# Patient Record
Sex: FEMALE | Race: WHITE | NOT HISPANIC OR LATINO
[De-identification: names, ages, dates, MRNs, and addresses within clinical notes are randomized per-mention and may not be internally consistent; named-entity substitution may affect disease eponyms.]

---

## 2017-02-06 ENCOUNTER — APPOINTMENT (OUTPATIENT)
Dept: CARDIOLOGY | Facility: CLINIC | Age: 67
End: 2017-02-06

## 2017-03-14 ENCOUNTER — OUTPATIENT (OUTPATIENT)
Dept: OUTPATIENT SERVICES | Facility: HOSPITAL | Age: 67
LOS: 1 days | Discharge: HOME | End: 2017-03-14

## 2017-03-16 ENCOUNTER — APPOINTMENT (OUTPATIENT)
Dept: CARDIOLOGY | Facility: CLINIC | Age: 67
End: 2017-03-16

## 2017-03-16 VITALS — HEART RATE: 81 BPM | SYSTOLIC BLOOD PRESSURE: 120 MMHG | DIASTOLIC BLOOD PRESSURE: 70 MMHG

## 2017-03-16 VITALS — HEIGHT: 62 IN | WEIGHT: 189 LBS | BODY MASS INDEX: 34.78 KG/M2

## 2017-03-17 ENCOUNTER — MEDICATION RENEWAL (OUTPATIENT)
Age: 67
End: 2017-03-17

## 2017-05-27 ENCOUNTER — TRANSCRIPTION ENCOUNTER (OUTPATIENT)
Age: 67
End: 2017-05-27

## 2017-06-27 DIAGNOSIS — I10 ESSENTIAL (PRIMARY) HYPERTENSION: ICD-10-CM

## 2017-07-06 ENCOUNTER — APPOINTMENT (OUTPATIENT)
Dept: CARDIOLOGY | Facility: CLINIC | Age: 67
End: 2017-07-06

## 2017-08-17 ENCOUNTER — OUTPATIENT (OUTPATIENT)
Dept: OUTPATIENT SERVICES | Facility: HOSPITAL | Age: 67
LOS: 1 days | Discharge: HOME | End: 2017-08-17

## 2017-08-17 DIAGNOSIS — Z00.00 ENCOUNTER FOR GENERAL ADULT MEDICAL EXAMINATION WITHOUT ABNORMAL FINDINGS: ICD-10-CM

## 2017-08-17 DIAGNOSIS — R07.9 CHEST PAIN, UNSPECIFIED: ICD-10-CM

## 2017-08-23 ENCOUNTER — APPOINTMENT (OUTPATIENT)
Dept: CARDIOLOGY | Facility: CLINIC | Age: 67
End: 2017-08-23

## 2017-08-23 VITALS — HEIGHT: 62 IN | WEIGHT: 192 LBS | BODY MASS INDEX: 35.33 KG/M2

## 2017-08-23 VITALS — HEART RATE: 75 BPM | DIASTOLIC BLOOD PRESSURE: 80 MMHG | SYSTOLIC BLOOD PRESSURE: 140 MMHG

## 2017-12-15 ENCOUNTER — RX RENEWAL (OUTPATIENT)
Age: 67
End: 2017-12-15

## 2018-01-09 ENCOUNTER — OUTPATIENT (OUTPATIENT)
Dept: OUTPATIENT SERVICES | Facility: HOSPITAL | Age: 68
LOS: 1 days | Discharge: HOME | End: 2018-01-09

## 2018-01-09 DIAGNOSIS — R53.83 OTHER FATIGUE: ICD-10-CM

## 2018-01-09 DIAGNOSIS — I10 ESSENTIAL (PRIMARY) HYPERTENSION: ICD-10-CM

## 2018-01-09 DIAGNOSIS — E78.5 HYPERLIPIDEMIA, UNSPECIFIED: ICD-10-CM

## 2018-01-09 DIAGNOSIS — R07.9 CHEST PAIN, UNSPECIFIED: ICD-10-CM

## 2018-01-09 DIAGNOSIS — Z00.00 ENCOUNTER FOR GENERAL ADULT MEDICAL EXAMINATION WITHOUT ABNORMAL FINDINGS: ICD-10-CM

## 2018-02-27 ENCOUNTER — APPOINTMENT (OUTPATIENT)
Dept: CARDIOLOGY | Facility: CLINIC | Age: 68
End: 2018-02-27

## 2018-03-05 ENCOUNTER — MEDICATION RENEWAL (OUTPATIENT)
Age: 68
End: 2018-03-05

## 2018-03-20 ENCOUNTER — APPOINTMENT (OUTPATIENT)
Dept: CARDIOLOGY | Facility: CLINIC | Age: 68
End: 2018-03-20

## 2018-03-20 VITALS
WEIGHT: 188 LBS | DIASTOLIC BLOOD PRESSURE: 80 MMHG | HEART RATE: 80 BPM | BODY MASS INDEX: 34.6 KG/M2 | SYSTOLIC BLOOD PRESSURE: 140 MMHG | HEIGHT: 62 IN

## 2018-03-20 DIAGNOSIS — Z87.898 PERSONAL HISTORY OF OTHER SPECIFIED CONDITIONS: ICD-10-CM

## 2018-04-25 ENCOUNTER — OUTPATIENT (OUTPATIENT)
Dept: OUTPATIENT SERVICES | Facility: HOSPITAL | Age: 68
LOS: 1 days | Discharge: HOME | End: 2018-04-25

## 2018-04-25 DIAGNOSIS — M79.642 PAIN IN LEFT HAND: ICD-10-CM

## 2018-05-02 ENCOUNTER — OUTPATIENT (OUTPATIENT)
Dept: OUTPATIENT SERVICES | Facility: HOSPITAL | Age: 68
LOS: 1 days | Discharge: HOME | End: 2018-05-02

## 2018-05-02 DIAGNOSIS — R52 PAIN, UNSPECIFIED: ICD-10-CM

## 2018-07-19 ENCOUNTER — OUTPATIENT (OUTPATIENT)
Dept: OUTPATIENT SERVICES | Facility: HOSPITAL | Age: 68
LOS: 1 days | Discharge: HOME | End: 2018-07-19

## 2018-07-19 DIAGNOSIS — G56.00 CARPAL TUNNEL SYNDROME, UNSPECIFIED UPPER LIMB: ICD-10-CM

## 2018-07-19 DIAGNOSIS — S63.501A UNSPECIFIED SPRAIN OF RIGHT WRIST, INITIAL ENCOUNTER: ICD-10-CM

## 2018-07-31 ENCOUNTER — APPOINTMENT (OUTPATIENT)
Dept: CARDIOLOGY | Facility: CLINIC | Age: 68
End: 2018-07-31

## 2018-08-13 ENCOUNTER — LABORATORY RESULT (OUTPATIENT)
Age: 68
End: 2018-08-13

## 2018-08-13 ENCOUNTER — OUTPATIENT (OUTPATIENT)
Dept: OUTPATIENT SERVICES | Facility: HOSPITAL | Age: 68
LOS: 1 days | Discharge: HOME | End: 2018-08-13

## 2018-08-13 ENCOUNTER — APPOINTMENT (OUTPATIENT)
Dept: OBGYN | Facility: CLINIC | Age: 68
End: 2018-08-13
Payer: COMMERCIAL

## 2018-08-13 VITALS — HEIGHT: 61 IN | WEIGHT: 185 LBS | BODY MASS INDEX: 34.93 KG/M2

## 2018-08-13 PROCEDURE — 81003 URINALYSIS AUTO W/O SCOPE: CPT | Mod: QW

## 2018-08-13 PROCEDURE — 99397 PER PM REEVAL EST PAT 65+ YR: CPT

## 2018-08-14 DIAGNOSIS — Z01.419 ENCOUNTER FOR GYNECOLOGICAL EXAMINATION (GENERAL) (ROUTINE) WITHOUT ABNORMAL FINDINGS: ICD-10-CM

## 2018-08-19 LAB
BILIRUB UR QL STRIP: NORMAL
CLARITY UR: CLEAR
GLUCOSE UR-MCNC: NORMAL
HCG UR QL: NORMAL EU/DL
HGB UR QL STRIP.AUTO: NORMAL
KETONES UR-MCNC: NORMAL
LEUKOCYTE ESTERASE UR QL STRIP: 75
NITRITE UR QL STRIP: NORMAL
PH UR STRIP: 5
PROT UR STRIP-MCNC: NORMAL
SP GR UR STRIP: 1.02

## 2018-08-28 ENCOUNTER — APPOINTMENT (OUTPATIENT)
Dept: CARDIOLOGY | Facility: CLINIC | Age: 68
End: 2018-08-28

## 2018-08-28 VITALS
SYSTOLIC BLOOD PRESSURE: 144 MMHG | DIASTOLIC BLOOD PRESSURE: 80 MMHG | HEART RATE: 72 BPM | HEIGHT: 61 IN | BODY MASS INDEX: 34.36 KG/M2 | WEIGHT: 182 LBS

## 2018-09-05 LAB
ALBUMIN SERPL ELPH-MCNC: 4 G/DL
ALP BLD-CCNC: 87 U/L
ALT SERPL-CCNC: 15 U/L
ANION GAP SERPL CALC-SCNC: 11 MMOL/L
AST SERPL-CCNC: 16 U/L
BASOPHILS # BLD AUTO: 0.03 K/UL
BASOPHILS NFR BLD AUTO: 0.5 %
BILIRUB SERPL-MCNC: 0.4 MG/DL
BUN SERPL-MCNC: 16 MG/DL
CALCIUM SERPL-MCNC: 9.2 MG/DL
CHLORIDE SERPL-SCNC: 100 MMOL/L
CHOLEST SERPL-MCNC: 147 MG/DL
CHOLEST/HDLC SERPL: 4 RATIO
CO2 SERPL-SCNC: 28 MMOL/L
CREAT SERPL-MCNC: 0.7 MG/DL
EOSINOPHIL # BLD AUTO: 0.41 K/UL
EOSINOPHIL NFR BLD AUTO: 6.8 %
GLUCOSE SERPL-MCNC: 91 MG/DL
HCT VFR BLD CALC: 44.1 %
HDLC SERPL-MCNC: 37 MG/DL
HGB BLD-MCNC: 14.4 G/DL
IMM GRANULOCYTES NFR BLD AUTO: 0.2 %
LDLC SERPL CALC-MCNC: 97 MG/DL
LYMPHOCYTES # BLD AUTO: 1.68 K/UL
LYMPHOCYTES NFR BLD AUTO: 27.9 %
MAN DIFF?: NORMAL
MCHC RBC-ENTMCNC: 29.1 PG
MCHC RBC-ENTMCNC: 32.7 GM/DL
MCV RBC AUTO: 89.1 FL
MONOCYTES # BLD AUTO: 0.51 K/UL
MONOCYTES NFR BLD AUTO: 8.5 %
NEUTROPHILS # BLD AUTO: 3.39 K/UL
NEUTROPHILS NFR BLD AUTO: 56.1 %
PLATELET # BLD AUTO: 261 K/UL
POTASSIUM SERPL-SCNC: 4.8 MMOL/L
PROT SERPL-MCNC: 6.9 G/DL
RBC # BLD: 4.95 M/UL
RBC # FLD: 14.1 %
SODIUM SERPL-SCNC: 139 MMOL/L
TRIGL SERPL-MCNC: 89 MG/DL
WBC # FLD AUTO: 6.03 K/UL

## 2018-09-19 ENCOUNTER — FORM ENCOUNTER (OUTPATIENT)
Age: 68
End: 2018-09-19

## 2018-09-20 ENCOUNTER — APPOINTMENT (OUTPATIENT)
Dept: OBGYN | Facility: CLINIC | Age: 68
End: 2018-09-20
Payer: COMMERCIAL

## 2018-09-20 ENCOUNTER — OUTPATIENT (OUTPATIENT)
Dept: OUTPATIENT SERVICES | Facility: HOSPITAL | Age: 68
LOS: 1 days | Discharge: HOME | End: 2018-09-20

## 2018-09-20 DIAGNOSIS — Z12.31 ENCOUNTER FOR SCREENING MAMMOGRAM FOR MALIGNANT NEOPLASM OF BREAST: ICD-10-CM

## 2018-09-20 PROCEDURE — 77085 DXA BONE DENSITY AXL VRT FX: CPT

## 2018-10-13 ENCOUNTER — APPOINTMENT (OUTPATIENT)
Dept: OBGYN | Facility: CLINIC | Age: 68
End: 2018-10-13
Payer: COMMERCIAL

## 2018-10-13 PROCEDURE — 76830 TRANSVAGINAL US NON-OB: CPT

## 2018-10-13 PROCEDURE — 76856 US EXAM PELVIC COMPLETE: CPT

## 2019-01-16 ENCOUNTER — OUTPATIENT (OUTPATIENT)
Dept: OUTPATIENT SERVICES | Facility: HOSPITAL | Age: 69
LOS: 1 days | Discharge: HOME | End: 2019-01-16

## 2019-01-16 DIAGNOSIS — M54.5 LOW BACK PAIN: ICD-10-CM

## 2019-01-25 ENCOUNTER — LABORATORY RESULT (OUTPATIENT)
Age: 69
End: 2019-01-25

## 2019-01-25 ENCOUNTER — OUTPATIENT (OUTPATIENT)
Dept: OUTPATIENT SERVICES | Facility: HOSPITAL | Age: 69
LOS: 1 days | Discharge: HOME | End: 2019-01-25

## 2019-01-25 DIAGNOSIS — E78.5 HYPERLIPIDEMIA, UNSPECIFIED: ICD-10-CM

## 2019-01-25 DIAGNOSIS — R53.83 OTHER FATIGUE: ICD-10-CM

## 2019-01-25 DIAGNOSIS — F41.9 ANXIETY DISORDER, UNSPECIFIED: ICD-10-CM

## 2019-01-25 DIAGNOSIS — Z01.419 ENCOUNTER FOR GYNECOLOGICAL EXAMINATION (GENERAL) (ROUTINE) WITHOUT ABNORMAL FINDINGS: ICD-10-CM

## 2019-01-25 DIAGNOSIS — I10 ESSENTIAL (PRIMARY) HYPERTENSION: ICD-10-CM

## 2019-01-29 ENCOUNTER — APPOINTMENT (OUTPATIENT)
Dept: CARDIOLOGY | Facility: CLINIC | Age: 69
End: 2019-01-29

## 2019-01-29 VITALS
BODY MASS INDEX: 34.93 KG/M2 | HEART RATE: 84 BPM | DIASTOLIC BLOOD PRESSURE: 80 MMHG | SYSTOLIC BLOOD PRESSURE: 140 MMHG | WEIGHT: 185 LBS | HEIGHT: 61 IN

## 2019-01-29 VITALS — DIASTOLIC BLOOD PRESSURE: 80 MMHG | SYSTOLIC BLOOD PRESSURE: 120 MMHG

## 2019-01-29 NOTE — HISTORY OF PRESENT ILLNESS
[FreeTextEntry1] : The patient has had no chest pain . Has had arthritic pain . Not sleeping well. Still not being treated for KEN

## 2019-01-29 NOTE — PHYSICAL EXAM
[General Appearance - Well Developed] : well developed [Normal Appearance] : normal appearance [Well Groomed] : well groomed [General Appearance - Well Nourished] : well nourished [No Deformities] : no deformities [General Appearance - In No Acute Distress] : no acute distress [Normal Conjunctiva] : the conjunctiva exhibited no abnormalities [Eyelids - No Xanthelasma] : the eyelids demonstrated no xanthelasmas [Normal Oral Mucosa] : normal oral mucosa [No Oral Pallor] : no oral pallor [No Oral Cyanosis] : no oral cyanosis [Respiration, Rhythm And Depth] : normal respiratory rhythm and effort [Exaggerated Use Of Accessory Muscles For Inspiration] : no accessory muscle use [Auscultation Breath Sounds / Voice Sounds] : lungs were clear to auscultation bilaterally [Abdomen Soft] : soft [Abdomen Tenderness] : non-tender [Abdomen Mass (___ Cm)] : no abdominal mass palpated [Abnormal Walk] : normal gait [Gait - Sufficient For Exercise Testing] : the gait was sufficient for exercise testing [Nail Clubbing] : no clubbing of the fingernails [Cyanosis, Localized] : no localized cyanosis [Petechial Hemorrhages (___cm)] : no petechial hemorrhages [Skin Color & Pigmentation] : normal skin color and pigmentation [] : no rash [No Venous Stasis] : no venous stasis [Skin Lesions] : no skin lesions [No Skin Ulcers] : no skin ulcer [No Xanthoma] : no  xanthoma was observed [Oriented To Time, Place, And Person] : oriented to person, place, and time [Affect] : the affect was normal [Mood] : the mood was normal [No Anxiety] : not feeling anxious [Normal Rate] : normal [Rhythm Regular] : regular [No Murmur] : no murmurs heard [2+] : left 2+ [No Pitting Edema] : no pitting edema present [FreeTextEntry1] : No JVD  [Rt] : no varicose veins of the right leg [Lt] : no varicose veins of the left leg

## 2019-01-29 NOTE — ASSESSMENT
[FreeTextEntry1] : The patient is feeliing ok. Upset about her daughter being  . No chest pain . No significant CAD on recent CT of the coronary arteries.

## 2019-01-29 NOTE — REVIEW OF SYSTEMS
[Shortness Of Breath] : shortness of breath [Dyspnea on exertion] : dyspnea during exertion [Chest Pain] : chest pain [Joint Pain] : joint pain [Joint Swelling] : joint swelling [Joint Stiffness] : joint stiffness [Negative] : Heme/Lymph [Chest  Pressure] : no chest pressure [Lower Ext Edema] : no extremity edema [Palpitations] : no palpitations

## 2019-02-03 LAB
ALBUMIN SERPL ELPH-MCNC: 3.9 G/DL
ALP BLD-CCNC: 88 U/L
ALT SERPL-CCNC: 18 U/L
ANION GAP SERPL CALC-SCNC: 13 MMOL/L
AST SERPL-CCNC: 16 U/L
BILIRUB SERPL-MCNC: 0.3 MG/DL
BUN SERPL-MCNC: 16 MG/DL
CALCIUM SERPL-MCNC: 8.9 MG/DL
CHLORIDE SERPL-SCNC: 98 MMOL/L
CO2 SERPL-SCNC: 27 MMOL/L
CREAT SERPL-MCNC: 0.7 MG/DL
GLUCOSE SERPL-MCNC: 94 MG/DL
POTASSIUM SERPL-SCNC: 4.3 MMOL/L
PROT SERPL-MCNC: 6.5 G/DL
SODIUM SERPL-SCNC: 138 MMOL/L

## 2019-02-25 ENCOUNTER — APPOINTMENT (OUTPATIENT)
Dept: CARDIOLOGY | Facility: CLINIC | Age: 69
End: 2019-02-25

## 2019-03-08 ENCOUNTER — OUTPATIENT (OUTPATIENT)
Dept: OUTPATIENT SERVICES | Facility: HOSPITAL | Age: 69
LOS: 1 days | Discharge: HOME | End: 2019-03-08

## 2019-03-26 ENCOUNTER — MEDICATION RENEWAL (OUTPATIENT)
Age: 69
End: 2019-03-26

## 2019-05-12 ENCOUNTER — TRANSCRIPTION ENCOUNTER (OUTPATIENT)
Age: 69
End: 2019-05-12

## 2019-05-16 ENCOUNTER — OUTPATIENT (OUTPATIENT)
Dept: OUTPATIENT SERVICES | Facility: HOSPITAL | Age: 69
LOS: 1 days | Discharge: HOME | End: 2019-05-16

## 2019-05-16 DIAGNOSIS — M70.61 TROCHANTERIC BURSITIS, RIGHT HIP: ICD-10-CM

## 2019-06-25 ENCOUNTER — OUTPATIENT (OUTPATIENT)
Dept: OUTPATIENT SERVICES | Facility: HOSPITAL | Age: 69
LOS: 1 days | Discharge: HOME | End: 2019-06-25

## 2019-06-25 DIAGNOSIS — R53.83 OTHER FATIGUE: ICD-10-CM

## 2019-06-25 DIAGNOSIS — I10 ESSENTIAL (PRIMARY) HYPERTENSION: ICD-10-CM

## 2019-06-25 DIAGNOSIS — Z78.0 ASYMPTOMATIC MENOPAUSAL STATE: ICD-10-CM

## 2019-06-25 DIAGNOSIS — E78.5 HYPERLIPIDEMIA, UNSPECIFIED: ICD-10-CM

## 2019-06-30 LAB
ALBUMIN SERPL ELPH-MCNC: 4.1 G/DL
ALP BLD-CCNC: 95 U/L
ALT SERPL-CCNC: 17 U/L
ANION GAP SERPL CALC-SCNC: 14 MMOL/L
AST SERPL-CCNC: 16 U/L
BASOPHILS # BLD AUTO: 0.06 K/UL
BASOPHILS NFR BLD AUTO: 0.9 %
BILIRUB SERPL-MCNC: 0.5 MG/DL
BUN SERPL-MCNC: 17 MG/DL
CALCIUM SERPL-MCNC: 9 MG/DL
CHLORIDE SERPL-SCNC: 102 MMOL/L
CHOLEST SERPL-MCNC: 150 MG/DL
CHOLEST/HDLC SERPL: 3.3 RATIO
CO2 SERPL-SCNC: 25 MMOL/L
CREAT SERPL-MCNC: 0.7 MG/DL
EOSINOPHIL # BLD AUTO: 0.38 K/UL
EOSINOPHIL NFR BLD AUTO: 5.6 %
GLUCOSE SERPL-MCNC: 93 MG/DL
HCT VFR BLD CALC: 43.9 %
HDLC SERPL-MCNC: 45 MG/DL
HGB BLD-MCNC: 14.2 G/DL
IMM GRANULOCYTES NFR BLD AUTO: 0.3 %
LDLC SERPL CALC-MCNC: 96 MG/DL
LYMPHOCYTES # BLD AUTO: 1.67 K/UL
LYMPHOCYTES NFR BLD AUTO: 24.5 %
MAN DIFF?: NORMAL
MCHC RBC-ENTMCNC: 29 PG
MCHC RBC-ENTMCNC: 32.3 G/DL
MCV RBC AUTO: 89.6 FL
MONOCYTES # BLD AUTO: 0.71 K/UL
MONOCYTES NFR BLD AUTO: 10.4 %
NEUTROPHILS # BLD AUTO: 3.99 K/UL
NEUTROPHILS NFR BLD AUTO: 58.3 %
PLATELET # BLD AUTO: 286 K/UL
POTASSIUM SERPL-SCNC: 4.6 MMOL/L
PROT SERPL-MCNC: 6.8 G/DL
RBC # BLD: 4.9 M/UL
RBC # FLD: 13.9 %
SODIUM SERPL-SCNC: 141 MMOL/L
TRIGL SERPL-MCNC: 87 MG/DL
WBC # FLD AUTO: 6.83 K/UL

## 2019-07-01 ENCOUNTER — APPOINTMENT (OUTPATIENT)
Dept: CARDIOLOGY | Facility: CLINIC | Age: 69
End: 2019-07-01
Payer: COMMERCIAL

## 2019-07-01 VITALS
DIASTOLIC BLOOD PRESSURE: 70 MMHG | HEART RATE: 79 BPM | WEIGHT: 192 LBS | HEIGHT: 61 IN | BODY MASS INDEX: 36.25 KG/M2 | SYSTOLIC BLOOD PRESSURE: 150 MMHG

## 2019-07-01 PROCEDURE — 93000 ELECTROCARDIOGRAM COMPLETE: CPT

## 2019-07-01 PROCEDURE — 99214 OFFICE O/P EST MOD 30 MIN: CPT

## 2019-07-01 NOTE — ASSESSMENT
[FreeTextEntry1] : The patient has had multiple family issues , taking care of her niece and daughter is getting . . She has not had CP . BP is controlled and LDL is at goal. The patient had a CTA in 2016 which showed no significant CAD .

## 2019-07-01 NOTE — HISTORY OF PRESENT ILLNESS
[FreeTextEntry1] : The patient is under a lot of stress. Taking on a lot of fam,nazario issues . . No chest pain

## 2019-09-19 ENCOUNTER — RX RENEWAL (OUTPATIENT)
Age: 69
End: 2019-09-19

## 2019-10-21 ENCOUNTER — OUTPATIENT (OUTPATIENT)
Dept: OUTPATIENT SERVICES | Facility: HOSPITAL | Age: 69
LOS: 1 days | Discharge: HOME | End: 2019-10-21

## 2019-10-21 DIAGNOSIS — M54.89 OTHER DORSALGIA: ICD-10-CM

## 2019-12-03 ENCOUNTER — APPOINTMENT (OUTPATIENT)
Dept: CARDIOLOGY | Facility: CLINIC | Age: 69
End: 2019-12-03

## 2020-01-06 ENCOUNTER — FORM ENCOUNTER (OUTPATIENT)
Age: 70
End: 2020-01-06

## 2020-01-07 ENCOUNTER — OUTPATIENT (OUTPATIENT)
Dept: OUTPATIENT SERVICES | Facility: HOSPITAL | Age: 70
LOS: 1 days | Discharge: HOME | End: 2020-01-07
Payer: COMMERCIAL

## 2020-01-07 DIAGNOSIS — Z12.31 ENCOUNTER FOR SCREENING MAMMOGRAM FOR MALIGNANT NEOPLASM OF BREAST: ICD-10-CM

## 2020-01-07 PROCEDURE — 77063 BREAST TOMOSYNTHESIS BI: CPT | Mod: 26

## 2020-01-07 PROCEDURE — 77067 SCR MAMMO BI INCL CAD: CPT | Mod: 26

## 2020-01-30 ENCOUNTER — LABORATORY RESULT (OUTPATIENT)
Age: 70
End: 2020-01-30

## 2020-01-30 ENCOUNTER — APPOINTMENT (OUTPATIENT)
Dept: OBGYN | Facility: CLINIC | Age: 70
End: 2020-01-30
Payer: COMMERCIAL

## 2020-01-30 VITALS — BODY MASS INDEX: 35.87 KG/M2 | WEIGHT: 190 LBS | HEIGHT: 61 IN

## 2020-01-30 LAB
BILIRUB UR QL STRIP: NORMAL
CLARITY UR: CLEAR
GLUCOSE UR-MCNC: NORMAL
HCG UR QL: NORMAL EU/DL
HGB UR QL STRIP.AUTO: NORMAL
KETONES UR-MCNC: NORMAL
LEUKOCYTE ESTERASE UR QL STRIP: 25
NITRITE UR QL STRIP: NORMAL
PH UR STRIP: 5
PROT UR STRIP-MCNC: NORMAL
SP GR UR STRIP: 1.02

## 2020-01-30 PROCEDURE — 99397 PER PM REEVAL EST PAT 65+ YR: CPT

## 2020-01-30 PROCEDURE — 81003 URINALYSIS AUTO W/O SCOPE: CPT | Mod: QW

## 2020-01-31 ENCOUNTER — LABORATORY RESULT (OUTPATIENT)
Age: 70
End: 2020-01-31

## 2020-02-03 ENCOUNTER — APPOINTMENT (OUTPATIENT)
Dept: OBGYN | Facility: CLINIC | Age: 70
End: 2020-02-03

## 2020-02-05 ENCOUNTER — APPOINTMENT (OUTPATIENT)
Dept: CARDIOLOGY | Facility: CLINIC | Age: 70
End: 2020-02-05
Payer: COMMERCIAL

## 2020-02-05 VITALS
DIASTOLIC BLOOD PRESSURE: 86 MMHG | HEIGHT: 61 IN | SYSTOLIC BLOOD PRESSURE: 128 MMHG | BODY MASS INDEX: 35.87 KG/M2 | HEART RATE: 72 BPM | WEIGHT: 190 LBS

## 2020-02-05 VITALS — SYSTOLIC BLOOD PRESSURE: 130 MMHG | DIASTOLIC BLOOD PRESSURE: 80 MMHG

## 2020-02-05 PROCEDURE — 99214 OFFICE O/P EST MOD 30 MIN: CPT

## 2020-02-05 PROCEDURE — 93000 ELECTROCARDIOGRAM COMPLETE: CPT

## 2020-02-05 NOTE — PHYSICAL EXAM
[General Appearance - Well Developed] : well developed [Well Groomed] : well groomed [Normal Appearance] : normal appearance [General Appearance - Well Nourished] : well nourished [General Appearance - In No Acute Distress] : no acute distress [No Deformities] : no deformities [Normal Conjunctiva] : the conjunctiva exhibited no abnormalities [Eyelids - No Xanthelasma] : the eyelids demonstrated no xanthelasmas [Normal Oral Mucosa] : normal oral mucosa [No Oral Pallor] : no oral pallor [No Oral Cyanosis] : no oral cyanosis [Respiration, Rhythm And Depth] : normal respiratory rhythm and effort [Exaggerated Use Of Accessory Muscles For Inspiration] : no accessory muscle use [Auscultation Breath Sounds / Voice Sounds] : lungs were clear to auscultation bilaterally [Abdomen Soft] : soft [Abdomen Tenderness] : non-tender [Abdomen Mass (___ Cm)] : no abdominal mass palpated [Gait - Sufficient For Exercise Testing] : the gait was sufficient for exercise testing [Abnormal Walk] : normal gait [Nail Clubbing] : no clubbing of the fingernails [Cyanosis, Localized] : no localized cyanosis [Petechial Hemorrhages (___cm)] : no petechial hemorrhages [Skin Color & Pigmentation] : normal skin color and pigmentation [] : no rash [No Venous Stasis] : no venous stasis [No Xanthoma] : no  xanthoma was observed [Skin Lesions] : no skin lesions [No Skin Ulcers] : no skin ulcer [Oriented To Time, Place, And Person] : oriented to person, place, and time [Mood] : the mood was normal [Affect] : the affect was normal [Normal Rate] : normal [No Anxiety] : not feeling anxious [Rhythm Regular] : regular [No Murmur] : no murmurs heard [2+] : left 2+ [No Pitting Edema] : no pitting edema present [FreeTextEntry1] : No JVD  [Rt] : no varicose veins of the right leg [Lt] : no varicose veins of the left leg

## 2020-02-05 NOTE — REVIEW OF SYSTEMS
[Shortness Of Breath] : shortness of breath [Dyspnea on exertion] : dyspnea during exertion [Chest Pain] : chest pain [Joint Swelling] : joint swelling [Joint Pain] : joint pain [Joint Stiffness] : joint stiffness [Negative] : Heme/Lymph [Chest  Pressure] : no chest pressure [Lower Ext Edema] : no extremity edema [Palpitations] : no palpitations

## 2020-02-05 NOTE — ASSESSMENT
[FreeTextEntry1] : The patient had a CTA in 2016 which showed no significant CAD . Her BP is stable when allowed to relax inoffic e LDL is increased

## 2020-02-07 ENCOUNTER — APPOINTMENT (OUTPATIENT)
Dept: OBGYN | Facility: CLINIC | Age: 70
End: 2020-02-07
Payer: COMMERCIAL

## 2020-02-07 PROCEDURE — 76830 TRANSVAGINAL US NON-OB: CPT

## 2020-02-25 ENCOUNTER — TRANSCRIPTION ENCOUNTER (OUTPATIENT)
Age: 70
End: 2020-02-25

## 2020-03-04 ENCOUNTER — TRANSCRIPTION ENCOUNTER (OUTPATIENT)
Age: 70
End: 2020-03-04

## 2020-04-25 ENCOUNTER — MESSAGE (OUTPATIENT)
Age: 70
End: 2020-04-25

## 2020-05-07 LAB
SARS-COV-2 IGG SERPL IA-ACNC: <0.1 INDEX
SARS-COV-2 IGG SERPL QL IA: NEGATIVE

## 2020-09-18 ENCOUNTER — APPOINTMENT (OUTPATIENT)
Dept: CARDIOLOGY | Facility: CLINIC | Age: 70
End: 2020-09-18
Payer: COMMERCIAL

## 2020-09-18 VITALS
HEART RATE: 77 BPM | SYSTOLIC BLOOD PRESSURE: 124 MMHG | BODY MASS INDEX: 34.23 KG/M2 | WEIGHT: 186 LBS | HEIGHT: 62 IN | DIASTOLIC BLOOD PRESSURE: 80 MMHG

## 2020-09-18 PROCEDURE — 93000 ELECTROCARDIOGRAM COMPLETE: CPT

## 2020-09-18 PROCEDURE — 99214 OFFICE O/P EST MOD 30 MIN: CPT

## 2020-09-18 NOTE — ASSESSMENT
[FreeTextEntry1] : The patient had a CTA in 2016 which showed no significant CAD . Her BP is stable and LDL is at goal and she has no symptoms

## 2020-09-18 NOTE — HISTORY OF PRESENT ILLNESS
[FreeTextEntry1] : No chest pain or shortness of breath.  Overasll feeling well except for stress .

## 2020-11-16 ENCOUNTER — TRANSCRIPTION ENCOUNTER (OUTPATIENT)
Age: 70
End: 2020-11-16

## 2021-03-02 ENCOUNTER — APPOINTMENT (OUTPATIENT)
Dept: PLASTIC SURGERY | Facility: CLINIC | Age: 71
End: 2021-03-02
Payer: COMMERCIAL

## 2021-03-02 VITALS — BODY MASS INDEX: 33.13 KG/M2 | WEIGHT: 180 LBS | HEIGHT: 62 IN

## 2021-03-02 PROCEDURE — 99203 OFFICE O/P NEW LOW 30 MIN: CPT

## 2021-03-02 PROCEDURE — 99072 ADDL SUPL MATRL&STAF TM PHE: CPT

## 2021-03-02 RX ORDER — MENTHOL/CAMPHOR 0.5 %-0.5%
1000 LOTION (ML) TOPICAL
Refills: 0 | Status: ACTIVE | COMMUNITY

## 2021-03-02 RX ORDER — FISH OIL/E/FATTY ACID5/HERB137 400 MG-5
CAPSULE ORAL
Refills: 0 | Status: ACTIVE | COMMUNITY

## 2021-03-02 NOTE — ASSESSMENT
[FreeTextEntry1] : 69 y/o F with right index finger mucous cyst and left hand Keinbock's disease\par \par -BL hand XRs\par \par as above\par right index finger mucous cyst for past few months\par also left Kienbocks, with lunate scloerosis seen ion 2018 xray (saw carrie--told NTD)\par \par ptr requests excision of mcuous cyst\par \par needs B/L hand xrays\par \par Regarding the hand surgery, we discussed the risk of bleeding, infection, need for additional unplanned surgery, need for postoperative hand occupational therapy, possible lack of improvement and diminished hand function.  We discussed prolonged recovery.  All questions were answered and all risks were well understood by the patient.\par \par Regarding the procedure, we discussed scarring, poor wound healing, bleeding, infection, need for additional surgery, and dissatisfaction with the outcome.  Also discussed possibility of keloid and/or hypertrophic scar formation as well as recurrence.  All questions were answered and risks understood.\par \par Due to COVID 19, pre-visit patient instructions were explained to the patient and their symptoms were checked upon arrival.  \par Masks were used by the health care providers and staff and the examination room was cleaned after the patient visit was completed.\par

## 2021-03-02 NOTE — PHYSICAL EXAM
[de-identified] : well-appearing, NAD [de-identified] : bilateral hands with OA changes, normal digital cascade and FROM, SILT, negative Phalen's and Tinels, right index dorsal DIP with 0.5cm raised cystic lesion

## 2021-03-02 NOTE — HISTORY OF PRESENT ILLNESS
[FreeTextEntry1] : Pt is a 71 y/o F, RHD, with PMH of HTN, HLD, and insomnia who presents for evaluation of right index finger mass x2 months. Denies pain, bleeding, or drainage. No XRs done. Denies trauma.\par \par Also c/o left hand pain. Has been diagnosed with Keinbock's disease. C/o intermittent pain to dorsal hand that radiates up her arm and wakes her up at night. Denies paraesthesias but c/o weakness. Had EMG done several years ago.\par \par nonsmoker, nondiabetic\par Occ:  at [facility]

## 2021-03-09 ENCOUNTER — RESULT REVIEW (OUTPATIENT)
Age: 71
End: 2021-03-09

## 2021-03-09 ENCOUNTER — OUTPATIENT (OUTPATIENT)
Dept: OUTPATIENT SERVICES | Facility: HOSPITAL | Age: 71
LOS: 1 days | Discharge: HOME | End: 2021-03-09
Payer: COMMERCIAL

## 2021-03-09 DIAGNOSIS — M67.449 GANGLION, UNSPECIFIED HAND: ICD-10-CM

## 2021-03-09 PROCEDURE — 73120 X-RAY EXAM OF HAND: CPT | Mod: 26,50

## 2021-03-26 ENCOUNTER — APPOINTMENT (OUTPATIENT)
Dept: CARDIOLOGY | Facility: CLINIC | Age: 71
End: 2021-03-26
Payer: COMMERCIAL

## 2021-03-26 VITALS — BODY MASS INDEX: 34.04 KG/M2 | HEIGHT: 62 IN | WEIGHT: 185 LBS | TEMPERATURE: 97.8 F | HEART RATE: 71 BPM

## 2021-03-26 VITALS — DIASTOLIC BLOOD PRESSURE: 80 MMHG | SYSTOLIC BLOOD PRESSURE: 120 MMHG

## 2021-03-26 PROCEDURE — 99214 OFFICE O/P EST MOD 30 MIN: CPT

## 2021-03-26 PROCEDURE — 99072 ADDL SUPL MATRL&STAF TM PHE: CPT

## 2021-03-26 PROCEDURE — 93000 ELECTROCARDIOGRAM COMPLETE: CPT

## 2021-03-26 NOTE — REVIEW OF SYSTEMS
[Shortness Of Breath] : shortness of breath [Dyspnea on exertion] : dyspnea during exertion [Chest  Pressure] : no chest pressure [Chest Pain] : chest pain [Lower Ext Edema] : no extremity edema [Palpitations] : no palpitations [Joint Pain] : joint pain [Joint Swelling] : joint swelling [Joint Stiffness] : joint stiffness [Negative] : Heme/Lymph

## 2021-03-26 NOTE — PHYSICAL EXAM
[General Appearance - Well Developed] : well developed [Normal Appearance] : normal appearance [Well Groomed] : well groomed [General Appearance - Well Nourished] : well nourished [No Deformities] : no deformities [General Appearance - In No Acute Distress] : no acute distress [Normal Conjunctiva] : the conjunctiva exhibited no abnormalities [Eyelids - No Xanthelasma] : the eyelids demonstrated no xanthelasmas [Normal Oral Mucosa] : normal oral mucosa [No Oral Pallor] : no oral pallor [No Oral Cyanosis] : no oral cyanosis [FreeTextEntry1] : No JVD  [Respiration, Rhythm And Depth] : normal respiratory rhythm and effort [Exaggerated Use Of Accessory Muscles For Inspiration] : no accessory muscle use [Auscultation Breath Sounds / Voice Sounds] : lungs were clear to auscultation bilaterally [Abdomen Soft] : soft [Abdomen Tenderness] : non-tender [Abdomen Mass (___ Cm)] : no abdominal mass palpated [Abnormal Walk] : normal gait [Gait - Sufficient For Exercise Testing] : the gait was sufficient for exercise testing [Nail Clubbing] : no clubbing of the fingernails [Cyanosis, Localized] : no localized cyanosis [Petechial Hemorrhages (___cm)] : no petechial hemorrhages [Skin Color & Pigmentation] : normal skin color and pigmentation [] : no rash [No Venous Stasis] : no venous stasis [Skin Lesions] : no skin lesions [No Skin Ulcers] : no skin ulcer [No Xanthoma] : no  xanthoma was observed [Oriented To Time, Place, And Person] : oriented to person, place, and time [Affect] : the affect was normal [Mood] : the mood was normal [No Anxiety] : not feeling anxious [Normal Rate] : normal [Rhythm Regular] : regular [No Murmur] : no murmurs heard [2+] : left 2+ [No Pitting Edema] : no pitting edema present [Rt] : no varicose veins of the right leg [Lt] : no varicose veins of the left leg

## 2021-03-26 NOTE — ASSESSMENT
[FreeTextEntry1] : The patient had a CTA in 2016 which showed no significant CAD . Her BP is stable and LDL is at goal and she has no symptoms The patient has been feeling well. The patient is going for right 2nd finger mucous cyst excision . The patient has more than 4 METS exercise capacity . She is an intermediate cardiac risk undergoing a minor risk procedure .

## 2021-03-26 NOTE — HISTORY OF PRESENT ILLNESS
[FreeTextEntry1] : No chest pain or shortness of breath.  The patient has not had chest pain or SOB .

## 2021-03-28 LAB
ALBUMIN SERPL ELPH-MCNC: 3.9 G/DL
ALP BLD-CCNC: 84 U/L
ALT SERPL-CCNC: 17 U/L
ANION GAP SERPL CALC-SCNC: 9 MMOL/L
AST SERPL-CCNC: 15 U/L
BASOPHILS # BLD AUTO: 0.06 K/UL
BASOPHILS NFR BLD AUTO: 1 %
BILIRUB SERPL-MCNC: 0.4 MG/DL
BUN SERPL-MCNC: 19 MG/DL
CALCIUM SERPL-MCNC: 8.9 MG/DL
CHLORIDE SERPL-SCNC: 104 MMOL/L
CHOLEST SERPL-MCNC: 143 MG/DL
CO2 SERPL-SCNC: 25 MMOL/L
CREAT SERPL-MCNC: 0.7 MG/DL
EOSINOPHIL # BLD AUTO: 0.5 K/UL
EOSINOPHIL NFR BLD AUTO: 8.1 %
GLUCOSE SERPL-MCNC: 96 MG/DL
HCT VFR BLD CALC: 42.9 %
HDLC SERPL-MCNC: 44 MG/DL
HGB BLD-MCNC: 13.8 G/DL
IMM GRANULOCYTES NFR BLD AUTO: 0.3 %
LDLC SERPL CALC-MCNC: 92 MG/DL
LYMPHOCYTES # BLD AUTO: 1.89 K/UL
LYMPHOCYTES NFR BLD AUTO: 30.7 %
MAN DIFF?: NORMAL
MCHC RBC-ENTMCNC: 29.2 PG
MCHC RBC-ENTMCNC: 32.2 G/DL
MCV RBC AUTO: 90.9 FL
MONOCYTES # BLD AUTO: 0.57 K/UL
MONOCYTES NFR BLD AUTO: 9.3 %
NEUTROPHILS # BLD AUTO: 3.11 K/UL
NEUTROPHILS NFR BLD AUTO: 50.6 %
NONHDLC SERPL-MCNC: 99 MG/DL
PLATELET # BLD AUTO: 290 K/UL
POTASSIUM SERPL-SCNC: 4.1 MMOL/L
PROT SERPL-MCNC: 6.5 G/DL
RBC # BLD: 4.72 M/UL
RBC # FLD: 13.6 %
SODIUM SERPL-SCNC: 138 MMOL/L
TRIGL SERPL-MCNC: 72 MG/DL
WBC # FLD AUTO: 6.15 K/UL

## 2021-03-29 ENCOUNTER — OUTPATIENT (OUTPATIENT)
Dept: OUTPATIENT SERVICES | Facility: HOSPITAL | Age: 71
LOS: 1 days | Discharge: HOME | End: 2021-03-29
Payer: COMMERCIAL

## 2021-03-29 VITALS
TEMPERATURE: 96 F | WEIGHT: 184.97 LBS | OXYGEN SATURATION: 97 % | HEIGHT: 62 IN | HEART RATE: 84 BPM | RESPIRATION RATE: 13 BRPM | DIASTOLIC BLOOD PRESSURE: 70 MMHG | SYSTOLIC BLOOD PRESSURE: 136 MMHG

## 2021-03-29 DIAGNOSIS — Z01.818 ENCOUNTER FOR OTHER PREPROCEDURAL EXAMINATION: ICD-10-CM

## 2021-03-29 DIAGNOSIS — Z96.659 PRESENCE OF UNSPECIFIED ARTIFICIAL KNEE JOINT: Chronic | ICD-10-CM

## 2021-03-29 DIAGNOSIS — M67.441 GANGLION, RIGHT HAND: ICD-10-CM

## 2021-03-29 DIAGNOSIS — Z96.651 PRESENCE OF RIGHT ARTIFICIAL KNEE JOINT: Chronic | ICD-10-CM

## 2021-03-29 NOTE — H&P PST ADULT - NSICDXPASTSURGICALHX_GEN_ALL_CORE_FT
PAST SURGICAL HISTORY:  H/O total knee replacement 2015, left    H/O total knee replacement, right 2009

## 2021-03-29 NOTE — H&P PST ADULT - HISTORY OF PRESENT ILLNESS
70y Female presents today for presurgical testing for excision of right index finger mucous cyst. Patient states that approximately 3 months ago she noted a firm growth on her right index finger DIP which is only painful if it is accidentally bumped. She denies any trauma or injury to the finger and denies any drainage.  Patient denies any CP, palpitations, SOB, cough, or dysuria. No recent URI or UTI.  Stated exercise tolerance is FOS 2          KEN screen reviewed    Patient denies any recent personal exposure to COVID19. Denies any sick contacts. Patient denies any travel within the past 30 days. Patient was instructed to quarantine until after procedure  PATIENT REPORTS COMPLETING 2 DOSE VACCINE FOR COVID 19 [place] ON 1/25/21.    Encounter for other preprocedural examination  Ganglion of right hand  ^M67.441 / [id number]                                                               04/12/21 [facility]    Anesthesia Alert  NO--Difficult Airway  NO--History of neck surgery or radiation  NO--Limited ROM of neck  NO--History of Malignant hyperthermia  NO--No personal or family history of Pseudocholinesterase deficiency.  YES--Prior Anesthesia Complication - PONV  NO--Latex Allergy  NO--Loose teeth  NO--History of Rheumatoid Arthritis  NO--KEN  NO--Other_____    Patient states that this is their complete medical history and list of medications

## 2021-03-29 NOTE — H&P PST ADULT - NSICDXPASTMEDICALHX_GEN_ALL_CORE_FT
PAST MEDICAL HISTORY:  Digital mucous cyst of finger right index finger    High cholesterol     HTN (hypertension)     Sleeping difficulty

## 2021-03-30 ENCOUNTER — RESULT REVIEW (OUTPATIENT)
Age: 71
End: 2021-03-30

## 2021-03-30 PROCEDURE — 71046 X-RAY EXAM CHEST 2 VIEWS: CPT | Mod: 26

## 2021-04-09 ENCOUNTER — OUTPATIENT (OUTPATIENT)
Dept: OUTPATIENT SERVICES | Facility: HOSPITAL | Age: 71
LOS: 1 days | Discharge: HOME | End: 2021-04-09

## 2021-04-09 ENCOUNTER — LABORATORY RESULT (OUTPATIENT)
Age: 71
End: 2021-04-09

## 2021-04-09 DIAGNOSIS — Z96.651 PRESENCE OF RIGHT ARTIFICIAL KNEE JOINT: Chronic | ICD-10-CM

## 2021-04-09 DIAGNOSIS — Z96.659 PRESENCE OF UNSPECIFIED ARTIFICIAL KNEE JOINT: Chronic | ICD-10-CM

## 2021-04-09 DIAGNOSIS — Z11.59 ENCOUNTER FOR SCREENING FOR OTHER VIRAL DISEASES: ICD-10-CM

## 2021-04-09 PROBLEM — E78.00 PURE HYPERCHOLESTEROLEMIA, UNSPECIFIED: Chronic | Status: ACTIVE | Noted: 2021-03-29

## 2021-04-09 PROBLEM — I10 ESSENTIAL (PRIMARY) HYPERTENSION: Chronic | Status: ACTIVE | Noted: 2021-03-29

## 2021-04-09 PROBLEM — M67.449 GANGLION, UNSPECIFIED HAND: Chronic | Status: ACTIVE | Noted: 2021-03-29

## 2021-04-09 PROBLEM — G47.9 SLEEP DISORDER, UNSPECIFIED: Chronic | Status: ACTIVE | Noted: 2021-03-29

## 2021-04-09 NOTE — ASU PATIENT PROFILE, ADULT - PMH
Digital mucous cyst of finger  right index finger  High cholesterol    HTN (hypertension)    Sleeping difficulty

## 2021-04-12 ENCOUNTER — APPOINTMENT (OUTPATIENT)
Dept: PLASTIC SURGERY | Facility: AMBULATORY SURGERY CENTER | Age: 71
End: 2021-04-12
Payer: COMMERCIAL

## 2021-04-12 ENCOUNTER — RESULT REVIEW (OUTPATIENT)
Age: 71
End: 2021-04-12

## 2021-04-12 ENCOUNTER — OUTPATIENT (OUTPATIENT)
Dept: OUTPATIENT SERVICES | Facility: HOSPITAL | Age: 71
LOS: 1 days | Discharge: HOME | End: 2021-04-12
Payer: COMMERCIAL

## 2021-04-12 VITALS
HEART RATE: 70 BPM | OXYGEN SATURATION: 98 % | SYSTOLIC BLOOD PRESSURE: 140 MMHG | DIASTOLIC BLOOD PRESSURE: 82 MMHG | RESPIRATION RATE: 22 BRPM

## 2021-04-12 VITALS
SYSTOLIC BLOOD PRESSURE: 164 MMHG | WEIGHT: 184.97 LBS | HEIGHT: 62 IN | DIASTOLIC BLOOD PRESSURE: 79 MMHG | HEART RATE: 83 BPM | TEMPERATURE: 98 F | OXYGEN SATURATION: 97 % | RESPIRATION RATE: 18 BRPM

## 2021-04-12 DIAGNOSIS — Z96.659 PRESENCE OF UNSPECIFIED ARTIFICIAL KNEE JOINT: Chronic | ICD-10-CM

## 2021-04-12 DIAGNOSIS — Z96.651 PRESENCE OF RIGHT ARTIFICIAL KNEE JOINT: Chronic | ICD-10-CM

## 2021-04-12 PROCEDURE — 26160 REMOVE TENDON SHEATH LESION: CPT | Mod: RT

## 2021-04-12 PROCEDURE — 88304 TISSUE EXAM BY PATHOLOGIST: CPT | Mod: 26

## 2021-04-12 RX ORDER — SODIUM CHLORIDE 9 MG/ML
1000 INJECTION, SOLUTION INTRAVENOUS
Refills: 0 | Status: DISCONTINUED | OUTPATIENT
Start: 2021-04-12 | End: 2021-04-26

## 2021-04-12 RX ORDER — HYDROMORPHONE HYDROCHLORIDE 2 MG/ML
0.5 INJECTION INTRAMUSCULAR; INTRAVENOUS; SUBCUTANEOUS
Refills: 0 | Status: DISCONTINUED | OUTPATIENT
Start: 2021-04-12 | End: 2021-04-12

## 2021-04-12 RX ORDER — ONDANSETRON 8 MG/1
4 TABLET, FILM COATED ORAL ONCE
Refills: 0 | Status: DISCONTINUED | OUTPATIENT
Start: 2021-04-12 | End: 2021-04-26

## 2021-04-12 RX ORDER — ACETAMINOPHEN 500 MG
650 TABLET ORAL ONCE
Refills: 0 | Status: DISCONTINUED | OUTPATIENT
Start: 2021-04-12 | End: 2021-04-26

## 2021-04-12 RX ORDER — MORPHINE SULFATE 50 MG/1
2 CAPSULE, EXTENDED RELEASE ORAL
Refills: 0 | Status: DISCONTINUED | OUTPATIENT
Start: 2021-04-12 | End: 2021-04-12

## 2021-04-12 RX ADMIN — SODIUM CHLORIDE 100 MILLILITER(S): 9 INJECTION, SOLUTION INTRAVENOUS at 10:02

## 2021-04-12 NOTE — ASU DISCHARGE PLAN (ADULT/PEDIATRIC) - CARE PROVIDER_API CALL
Neptali Golden)  Plastic Surgery; Surgery of the Hand  [address] [place] [address], [address]  [place] [address]  Phone: [phone]  Fax: [phone]  Follow Up Time:

## 2021-04-12 NOTE — CHART NOTE - NSCHARTNOTEFT_GEN_A_CORE
"Subjective:       Patient ID: Cindy Lyman is a 80 y.o. female.    Chief Complaint: Follow-up    Diagnosis: Anemia of chronic disease  HPI      She has  past medical history of CAD, asthma, CHF, COPD, depression, hypertension, thyroid disease, seen today for f/u for anemia of chronic disease. Bone marrow biopsy neg for hematological malignancy    Pt hospitalized July 2017 for abd pain.  she was found to be septic with fever and tachycardia (no leukocytosis). She was treated for a UTI and followed up with her PCP. She had persistent RUQ abdominal pain .  Followed by Surgery and underwentnd was referred to general surgery but has not yet seen them. H   US abdomen showed biliary sludge and cholelithiasis but no definite sonographic evidence to suggest acute cholecystitis. MRCP showed, "Markedly distended gallbladder with innumerable dependent gallstones.  There has been development of pericholecystic fluid near the fundus of the gallbladder that could reflect the sequela of cholecystitis" as well as a mild to moderate pericardial effusion. Plavix held in anticipation of cholecystectomy. NST 7/10/2017 stable. Low-intermediate cardiovascular risk for surgery. Laproscopic cholecystectomy 7/13/17. Intraoperative angiogram showed a retained common bile duct stone. Post cholecystectomy, patient failed extubation, was re-intubated then extubated   . Liver enzymes and TBil, have  improved, suggesting patient may have passed the stone seen in CBD. GI had initially considered ERCP, but will treat conservatively as LFT's normalizing  SHe reports she underwent 1 urpbc transfusion during hospitalization  Today, she is doing well  She continues with chronic arthalgias and  back pain-stable  She ambulates with assistance of walker  Mild GALVAN-stable, chronic  No melena,hematochezia or change in bowel habits    She is followed by GI and  Has undergone GI eval recently as outpat  Records currently not available      She is " PACU ANESTHESIA ADMISSION NOTE      Procedure: Excision, mucous cyst, finger      Post op diagnosis:      ____  Intubated  TV:______       Rate: ______      FiO2: ______    _x___  Patent Airway    _x___  Full return of protective reflexes    _x___  Full recovery from anesthesia / back to baseline status    Vitals  SPO2:-99% on 2l nc  HR:-68  RR:-14  B.P:-118/58  TEMP:-97.8    Mental Status:  _x___ Awake   ___x_ Alert   _____ Drowsy   _____ Sedated    Nausea/Vomiting:  _x___  NO       ______Yes,   See Post - Op Orders         Pain Scale (0-10):  __0___    Treatment: _x___ None    __x__ See Post - Op/PCA Orders    Post - Operative Fluids:   ___ Oral   ____x See Post - Op Orders    Plan: Discharge:   ___x_Home       _____Floor     _____Critical Care    _____  Other:_________________    Comments:  Report endorsed to RN in pacu  Vitals stable  No anesthesia issues or complications noted.  Discharge to patient to  home when criteria met. "followed by Cardiology for CHF.      Hx of RLE DVT  S/p anticoagulation completed 11/2015     Colonoscopy 5 yrs ago  [facility] - unremarkable    She is followed by Pulm for COPD    Bone Marrow biopsy 5/26/2016  Hypercellular marrow for age, 50-70%, with trilineage hematopoiesis, see comment  --Erythroid hyperplasia  --Mild plasmacytosis, 5-10%, polytypic by in situ hybridization studies, see comment  --No increase in blasts  --Adequate megakaryocytes  --Decreased stainable iron  --Mild reticulin fibrosis  COMMENT: Concomitantly submitted flow cytometric analysis detects no abnormal hematopoietic population. Bcells are polyclonal with a subset of hematogones detected, and T cells are immunophenotypically unremarkable.  The blast gate is not increased.Although there is a mild plasmacytosis, by in situ hybridization studies, this appears polytypic. Correlate clinicallyand with any available cytogenetic and molecular studies    Plasma cell proliferative disorder, FISH:  An insufficient number of plasma cells were observed using cytoplasmic immunoglobulin staining method .This result does not exclude the presence of a plasma cell proliferative disorder."    Congo red stain neg    PAST MEDICAL HISTORY:  Aortic stenosis, arthritis, asthma, CAD, carpal tunnel, cataracts, CHF, COPD, depression, hyperlipidemia, hypertension, pulmonary hypertension, thyroid disease, TMJ.    PAST SURGICAL HISTORY:  Partial hysterectomy, carpal tunnel release, eye surgery, left hip replacement, back surgery, TMJ.            Review of Systems   Constitutional: Negative for appetite change, fatigue and unexpected weight change.   HENT: Negative for congestion, hearing loss and nosebleeds.    Eyes: Negative for visual disturbance.   Respiratory: Positive for shortness of breath. Negative for cough and chest tightness.    Cardiovascular: Negative for chest pain and leg swelling.   Gastrointestinal: Negative for abdominal pain, blood in stool, " "constipation, diarrhea, nausea and vomiting.   Genitourinary: Negative for flank pain and hematuria.   Musculoskeletal: Positive for arthralgias and back pain. Negative for gait problem, joint swelling and neck pain.   Skin: Positive for rash.        No petechiae, ecchymoses   Neurological: Negative for dizziness, light-headedness, numbness and headaches.   Hematological: Negative for adenopathy. Does not bruise/bleed easily.         Lab Results   Component Value Date    WBC 4.45 09/11/2017    HGB 9.5 (L) 09/11/2017    HCT 30.2 (L) 09/11/2017    MCV 92 09/11/2017     09/11/2017         Objective:       Vitals:    09/15/17 1308   BP: (!) 146/70   BP Location: Left arm   Patient Position: Sitting   BP Method: Medium (Automatic)   Pulse: 62   Temp: 98.8 °F (37.1 °C)   TempSrc: Oral   SpO2: 98%   Weight: 92.4 kg (203 lb 13 oz)   Height: 5' 5" (1.651 m)       Physical Exam   Constitutional: She is oriented to person, place, and time. She appears well-developed and well-nourished.   HENT:   Head: Normocephalic.   Mouth/Throat: Oropharynx is clear and moist. No oropharyngeal exudate.   Eyes: Conjunctivae and lids are normal. Pupils are equal, round, and reactive to light. No scleral icterus.   Neck: Normal range of motion. Neck supple. No thyromegaly present.   Cardiovascular: Normal rate and regular rhythm.    Murmur heard.  Pulmonary/Chest: Breath sounds normal. She has no wheezes. She has no rales.   Abdominal: Soft. Bowel sounds are normal. She exhibits no distension and no mass. There is no hepatosplenomegaly. There is no tenderness. There is no rebound and no guarding.   Musculoskeletal: Normal range of motion. She exhibits edema ( 1+ RLE edema). She exhibits no tenderness.   Lymphadenopathy:     She has no cervical adenopathy.     She has no axillary adenopathy.        Right: No supraclavicular adenopathy present.        Left: No supraclavicular adenopathy present.   Neurological: She is alert and oriented to " "person, place, and time. No cranial nerve deficit. Coordination normal.   Skin: Skin is warm and dry. No ecchymosis, no petechiae and no rash noted. No erythema.   Psychiatric: She has a normal mood and affect.         Results for MARIE TURNER (MRN [id number]) as of 6/15/2017 13:23   Ref. Range 6/10/2016 11:08 1/9/2017 13:46 6/12/2017 10:52   Elmer Free Light Chains Latest Ref Range: 0.33 - 1.94 mg/dL 4.01 (H) 6.29 (H) 4.62 (H)   Lambda Free Light Chains Latest Ref Range: 0.57 - 2.63 mg/dL 1.68 2.93 (H) 1.63   Kappa/Lambda FLC Ratio Latest Ref Range: 0.26 - 1.65  2.39 (H) 2.15 (H) 2.83 (H)                 Lab Results   Component Value Date    WBC 4.45 09/11/2017    HGB 9.5 (L) 09/11/2017    HCT 30.2 (L) 09/11/2017    MCV 92 09/11/2017     09/11/2017       Lab Results   Component Value Date    IRON 65 09/11/2017    TIBC 318 09/11/2017    FERRITIN 93 09/11/2017     Bone Marrow biopsy 5/26/2016  Hypercellular marrow for age, 50-70%, with trilineage hematopoiesis, see comment  --Erythroid hyperplasia  --Mild plasmacytosis, 5-10%, polytypic by in situ hybridization studies, see comment  --No increase in blasts  --Adequate megakaryocytes  --Decreased stainable iron  --Mild reticulin fibrosis  COMMENT: Concomitantly submitted flow cytometric analysis detects no abnormal hematopoietic population. Bcells are polyclonal with a subset of hematogones detected, and T cells are immunophenotypically unremarkable.  The blast gate is not increased.Although there is a mild plasmacytosis, by in situ hybridization studies, this appears polytypic. Correlate clinicallyand with any available cytogenetic and molecular studies    Plasma cell proliferative disorder, FISH:  An insufficient number of plasma cells were observed using cytoplasmic immunoglobulin staining method .This result does not exclude the presence of a plasma cell proliferative disorder."  Assessment:       1. Anemia of chronic disease    2. Chronic " obstructive pulmonary disease, unspecified COPD type        Plan:       Pt clinically stable  Hb 9.5 g/dl   Follow-up on recent GI eval  Colonoscopy 5 yrs ago [facility] [place]- unremarkable  S/p extensive hematological workup including bmbx- neg for hematological malignancy  Cont to monitor  No active  bleeding   Follow-up with GI next week    F/u  2mo with cbc, Fe studies, retic ct FLC  prior to f/u      CC: Jeremiah Reeves M.D.

## 2021-04-12 NOTE — ASU DISCHARGE PLAN (ADULT/PEDIATRIC) - ASU DC SPECIAL INSTRUCTIONSFT
Please follow up with Dr. Golden within 1 week after discharge from the hospital. You may call [phone] to schedule an appointment.     Please keep your dressing on, clean and dry. This will be removed in the office at follow up.     Please take Tylenol and Motrin over the counter as directed for pain.

## 2021-04-13 ENCOUNTER — NON-APPOINTMENT (OUTPATIENT)
Age: 71
End: 2021-04-13

## 2021-04-14 LAB — SURGICAL PATHOLOGY STUDY: SIGNIFICANT CHANGE UP

## 2021-04-19 ENCOUNTER — APPOINTMENT (OUTPATIENT)
Dept: PLASTIC SURGERY | Facility: CLINIC | Age: 71
End: 2021-04-19
Payer: COMMERCIAL

## 2021-04-19 PROCEDURE — 99024 POSTOP FOLLOW-UP VISIT: CPT

## 2021-04-19 NOTE — HISTORY OF PRESENT ILLNESS
[FreeTextEntry1] : Pt is a 69 y/o F, RHD, with PMH of HTN, HLD, and insomnia who presents for evaluation of right index finger mass x2 months. Denies pain, bleeding, or drainage. No XRs done. Denies trauma.\par \par Also c/o left hand pain. Has been diagnosed with Keinbock's disease. C/o intermittent pain to dorsal hand that radiates up her arm and wakes her up at night. Denies paraesthesias but c/o weakness. Had EMG done several years ago.\par \par nonsmoker, nondiabetic\par Occ:  at [facility]\par \par Interval hx (4/19/21). Patient presents today POD#7 s/p excision of right index mucous cyst. Doing well c/o slight discomfort and swelling. Denies any f/c or bleeding.

## 2021-04-19 NOTE — ASSESSMENT
[FreeTextEntry1] : 71 y/o F with right index finger mucous cyst and left hand Keinbock's disease now POD#7 s/p excision of mucous cyst. Doing well. \par \par - dressing changed\par - pathology discussed\par - post-op instructions reviewed and all questions answered\par - f/u next week for suture removal \par \par \par Due to COVID 19, pre-visit patient instructions were explained to the patient and their symptoms were checked upon arrival.  \par Masks were used by the health care providers and staff and the examination room was cleaned after the patient visit was completed.\par

## 2021-04-19 NOTE — DATA REVIEWED
[FreeTextEntry1] : \par  Pathology             Final\par \par No Documents Attached\par \par \par \par \par   [facility] Accession Number : [id number]\par \par GIULIA POWER                   1\par \par \par \par Surgical Final Report\par \par \par \par \par Final Diagnosis\par Labelled as right index finger mucous cyst:\par - Fragments of osteocartilage and fibroconnective tissue\par suggestive of mucous\par cyst.\par \par Verified by: Danika Saenz MD\par (Electronic Signature)\par Reported on: 04/14/21 12:32 EDT, [address] [place] [address]\par Phone: [phone]   Fax: [phone]\par _________________________________________________________________\par \par Clinical History\par Excision\par Right index finger cyst x several months\par COVID (-)\par \par Specimen(s) Submitted\par Right index finger mucous cyst\par \par Gross Description\par The specimen is received in formalin, labeled "right index finger\par mucous cyst" and consists of multiple irregular pieces of tan to\par light brown soft tissue measuring 0.5 x 0.3 x 0.2 cm in\par aggregate. The specimen is submitted entirely. (1 block)\par \par Specimen was received and underwent gross examination at [facility], [address] [place] [address].\par \par 04/13/2021 09:05:22 EDT rr\par \par Perioperative Diagnosis\par Right index finger mucous cyst\par \par  \par \par  Ordered by: TERRI BERRY IV       Collected/Examined: 12Apr2021 09:00AM       \par Verification Required       Stage: Final       \par  Performed at: [facility]       Resulted: 14Apr2021 12:32PM       Last Updated: 14Apr2021 12:33PM       Accession: [id number]

## 2021-04-20 DIAGNOSIS — E78.00 PURE HYPERCHOLESTEROLEMIA, UNSPECIFIED: ICD-10-CM

## 2021-04-20 DIAGNOSIS — F41.9 ANXIETY DISORDER, UNSPECIFIED: ICD-10-CM

## 2021-04-20 DIAGNOSIS — Z88.1 ALLERGY STATUS TO OTHER ANTIBIOTIC AGENTS STATUS: ICD-10-CM

## 2021-04-20 DIAGNOSIS — Z88.0 ALLERGY STATUS TO PENICILLIN: ICD-10-CM

## 2021-04-20 DIAGNOSIS — M25.841 OTHER SPECIFIED JOINT DISORDERS, RIGHT HAND: ICD-10-CM

## 2021-04-20 DIAGNOSIS — Z96.653 PRESENCE OF ARTIFICIAL KNEE JOINT, BILATERAL: ICD-10-CM

## 2021-04-20 DIAGNOSIS — I10 ESSENTIAL (PRIMARY) HYPERTENSION: ICD-10-CM

## 2021-04-20 DIAGNOSIS — M85.80 OTHER SPECIFIED DISORDERS OF BONE DENSITY AND STRUCTURE, UNSPECIFIED SITE: ICD-10-CM

## 2021-04-26 ENCOUNTER — APPOINTMENT (OUTPATIENT)
Dept: PLASTIC SURGERY | Facility: CLINIC | Age: 71
End: 2021-04-26
Payer: COMMERCIAL

## 2021-04-26 PROCEDURE — 99024 POSTOP FOLLOW-UP VISIT: CPT

## 2021-04-26 NOTE — DATA REVIEWED
[FreeTextEntry1] : \par  Pathology             Final\par \par No Documents Attached\par \par \par \par \par   [facility] Accession Number : [id number]\par \par GIULIA POWER                   1\par \par \par \par Surgical Final Report\par \par \par \par \par Final Diagnosis\par Labelled as right index finger mucous cyst:\par - Fragments of osteocartilage and fibroconnective tissue\par suggestive of mucous\par cyst.\par \par Verified by: Danika Saenz MD\par (Electronic Signature)\par Reported on: 04/14/21 12:32 EDT, [address] [place] [address]\par Phone: [phone]   Fax: [phone]\par _________________________________________________________________\par \par Clinical History\par Excision\par Right index finger cyst x several months\par COVID (-)\par \par Specimen(s) Submitted\par Right index finger mucous cyst\par \par Gross Description\par The specimen is received in formalin, labeled "right index finger\par mucous cyst" and consists of multiple irregular pieces of tan to\par light brown soft tissue measuring 0.5 x 0.3 x 0.2 cm in\par aggregate. The specimen is submitted entirely. (1 block)\par \par Specimen was received and underwent gross examination at [facility], [address] [place] [address].\par \par 04/13/2021 09:05:22 EDT rr\par \par Perioperative Diagnosis\par Right index finger mucous cyst\par \par  \par \par  Ordered by: TERRI BERRY IV       Collected/Examined: 12Apr2021 09:00AM       \par Verification Required       Stage: Final       \par  Performed at: [facility]       Resulted: 14Apr2021 12:32PM       Last Updated: 14Apr2021 12:33PM       Accession: [id number]

## 2021-04-26 NOTE — HISTORY OF PRESENT ILLNESS
[FreeTextEntry1] : Pt is a 71 y/o F, RHD, with PMH of HTN, HLD, and insomnia who presents for evaluation of right index finger mass x2 months. Denies pain, bleeding, or drainage. No XRs done. Denies trauma.\par \par Also c/o left hand pain. Has been diagnosed with Keinbock's disease. C/o intermittent pain to dorsal hand that radiates up her arm and wakes her up at night. Denies paraesthesias but c/o weakness. Had EMG done several years ago.\par \par nonsmoker, nondiabetic\par Occ:  at [facility]\par \par Interval hx (4/19/21). Patient presents today POD#7 s/p excision of right index mucous cyst. Doing well c/o slight discomfort and swelling. Denies any f/c or bleeding. \par \par Interval hx (4/26/21): Pt presents today POD #14 s/p excision of right index mucous cyst. C/o mild incisional tenderness as expected. Denies f/c or drainage.

## 2021-04-26 NOTE — ASSESSMENT
[FreeTextEntry1] : 71 y/o F with right index finger mucous cyst and left hand Keinbock's disease now POD#7 s/p excision of mucous cyst. Doing well. \par \par - sutures removed\par - pathology discussed\par - post-op instructions reviewed and all questions answered\par - f/u PRN\par \par Due to COVID 19, pre-visit patient instructions were explained to the patient and their symptoms were checked upon arrival.  \par Masks were used by the health care providers and staff and the examination room was cleaned after the patient visit was completed.\par

## 2021-04-26 NOTE — PHYSICAL EXAM
[de-identified] : well-appearing, NAD [de-identified] : bilateral hands with OA changes, normal digital cascade and FROM, SILT, negative Phalen's and Tinels, right index dorsal DIP incision healing well, c/d/i with minimal swelling as expected, no cellulitis or drainage

## 2021-05-04 ENCOUNTER — APPOINTMENT (OUTPATIENT)
Dept: PLASTIC SURGERY | Facility: CLINIC | Age: 71
End: 2021-05-04
Payer: COMMERCIAL

## 2021-05-04 DIAGNOSIS — M67.449 GANGLION, UNSPECIFIED HAND: ICD-10-CM

## 2021-05-04 PROCEDURE — 99024 POSTOP FOLLOW-UP VISIT: CPT

## 2021-05-04 NOTE — DATA REVIEWED
[FreeTextEntry1] : \par  Pathology             Final\par \par No Documents Attached\par \par \par \par \par   [facility] Accession Number : [id number]\par \par GUILIA POWER                   1\par \par \par \par Surgical Final Report\par \par \par \par Final Diagnosis\par Labelled as right index finger mucous cyst:\par - Fragments of osteocartilage and fibroconnective tissue\par suggestive of mucous\par cyst.\par \par Verified by: Danika Saenz MD\par (Electronic Signature)\par Reported on: 04/14/21 12:32 EDT, [address] [place] [address]\par Phone: [phone]   Fax: [phone]\par _________________________________________________________________\par \par Clinical History\par Excision\par Right index finger cyst x several months\par COVID (-)\par \par Specimen(s) Submitted\par Right index finger mucous cyst\par \par Gross Description\par The specimen is received in formalin, labeled "right index finger\par mucous cyst" and consists of multiple irregular pieces of tan to\par light brown soft tissue measuring 0.5 x 0.3 x 0.2 cm in\par aggregate. The specimen is submitted entirely. (1 block)\par \par Specimen was received and underwent gross examination at [facility], [address] [place] [address].\par \par 04/13/2021 09:05:22 EDT rr\par \par Perioperative Diagnosis\par Right index finger mucous cyst\par \par  \par \par  Ordered by: TERRI BERRY IV       Collected/Examined: 12Apr2021 09:00AM       \par Verification Required       Stage: Final       \par  Performed at: [facility]       Resulted: 14Apr2021 12:32PM       Last Updated: 14Apr2021 12:33PM       Accession: [id number]

## 2021-05-04 NOTE — PHYSICAL EXAM
[de-identified] : well-appearing, NAD [de-identified] : bilateral hands with OA changes, normal digital cascade and FROM, SILT, negative Phalen's and Tinels, right index dorsal DIP incision healing well with new blanching erythema and swelling, +tenderness to palpation, no open wounds or drainage

## 2021-05-04 NOTE — ASSESSMENT
[FreeTextEntry1] : 69 y/o F with right index finger mucous cyst and left hand Keinbock's disease now 3 weeks s/p excision of mucous cyst. Doing well with early cellulitis. \par \par - Rx Doxy\par - patient reassured\par - post-op instructions reviewed and all questions answered\par - f/u 10 days \par \par Seen with Dr. Golden. \par \par Due to COVID 19, pre-visit patient instructions were explained to the patient and their symptoms were checked upon arrival.  \par Masks were used by the health care providers and staff and the examination room was cleaned after the patient visit was completed.\par

## 2021-05-04 NOTE — HISTORY OF PRESENT ILLNESS
[FreeTextEntry1] : Pt is a 71 y/o F, RHD, with PMH of HTN, HLD, and insomnia who presents for evaluation of right index finger mass x2 months. Denies pain, bleeding, or drainage. No XRs done. Denies trauma.\par \par Also c/o left hand pain. Has been diagnosed with Keinbock's disease. C/o intermittent pain to dorsal hand that radiates up her arm and wakes her up at night. Denies paraesthesias but c/o weakness. Had EMG done several years ago.\par \par nonsmoker, nondiabetic\par Occ:  at [facility]\par \par Interval hx (4/19/21). Patient presents today POD#7 s/p excision of right index mucous cyst. Doing well c/o slight discomfort and swelling. Denies any f/c or bleeding. \par \par Interval hx (4/26/21): Pt presents today POD #14 s/p excision of right index mucous cyst. C/o mild incisional tenderness as expected. Denies f/c or drainage.\par \par Interval hx (5/4/21): Pt presents today 3 week s/p excision of right index mucous cyst c/o right index finger burning, redness and swelling. Denies any f/c or drainage. No hand trauma.

## 2021-05-24 ENCOUNTER — APPOINTMENT (OUTPATIENT)
Dept: PLASTIC SURGERY | Facility: CLINIC | Age: 71
End: 2021-05-24
Payer: COMMERCIAL

## 2021-05-24 PROCEDURE — 99024 POSTOP FOLLOW-UP VISIT: CPT

## 2021-05-24 NOTE — HISTORY OF PRESENT ILLNESS
[FreeTextEntry1] : Pt is a 71 y/o F, RHD, with PMH of HTN, HLD, and insomnia who presents for evaluation of right index finger mass x2 months. Denies pain, bleeding, or drainage. No XRs done. Denies trauma.\par \par Also c/o left hand pain. Has been diagnosed with Keinbock's disease. C/o intermittent pain to dorsal hand that radiates up her arm and wakes her up at night. Denies paraesthesias but c/o weakness. Had EMG done several years ago.\par \par nonsmoker, nondiabetic\par Occ:  at [facility]\par \par Interval hx (4/19/21). Patient presents today POD#7 s/p excision of right index mucous cyst. Doing well c/o slight discomfort and swelling. Denies any f/c or bleeding. \par \par Interval hx (4/26/21): Pt presents today POD #14 s/p excision of right index mucous cyst. C/o mild incisional tenderness as expected. Denies f/c or drainage.\par \par Interval hx (5/4/21): Pt presents today 3 week s/p excision of right index mucous cyst c/o right index finger burning, redness and swelling. Denies any f/c or drainage. No hand trauma. \par \par Interval hx (5/24/21): Pt presents today 6 weeks postop. Completed PO Doxycycline as prescribed. C/o persistent though improved redness. Denies pain, drainage, or f/c.

## 2021-05-24 NOTE — PHYSICAL EXAM
I SPOKE WITH THE PT AND WENT THRU THE EXT MED HISTORY TO COMPLETE THE MED REC



METOPROLOL SUCC 25MG HAS DIRECTIONS OF 1 TAB DAILY, HOWEVER PT WAS TOLD BY HIS CARDIOLOGIST 
TO INCREASE 2 TABS DAILY DUE TO UNCONTROLLED BP



ALL MEDICATIONS LISTED ON THE EXT MED HISTORY



OTC MEDS:

TYLENOL [de-identified] : well-appearing, NAD [de-identified] : bilateral hands with OA changes, normal digital cascade and FROM, SILT, negative Phalen's and Tinels, right index dorsal DIP incision healing well with mild blanching erythema and swelling, nontender, no open wounds or drainage

## 2021-05-24 NOTE — DATA REVIEWED
[FreeTextEntry1] : \par  Pathology             Final\par \par No Documents Attached\par \par \par \par \par   [facility] Accession Number : [id number]\par \par GIULIA POWER                   1\par \par \par \par Surgical Final Report\par \par \par \par Final Diagnosis\par Labelled as right index finger mucous cyst:\par - Fragments of osteocartilage and fibroconnective tissue\par suggestive of mucous\par cyst.\par \par Verified by: Danika Saenz MD\par (Electronic Signature)\par Reported on: 04/14/21 12:32 EDT, [address] [place] [address]\par Phone: [phone]   Fax: [phone]\par _________________________________________________________________\par \par Clinical History\par Excision\par Right index finger cyst x several months\par COVID (-)\par \par Specimen(s) Submitted\par Right index finger mucous cyst\par \par Gross Description\par The specimen is received in formalin, labeled "right index finger\par mucous cyst" and consists of multiple irregular pieces of tan to\par light brown soft tissue measuring 0.5 x 0.3 x 0.2 cm in\par aggregate. The specimen is submitted entirely. (1 block)\par \par Specimen was received and underwent gross examination at [facility], [address] [place] [address].\par \par 04/13/2021 09:05:22 EDT rr\par \par Perioperative Diagnosis\par Right index finger mucous cyst\par \par  \par \par  Ordered by: TERRI BERRY IV       Collected/Examined: 12Apr2021 09:00AM       \par Verification Required       Stage: Final       \par  Performed at: [facility]       Resulted: 14Apr2021 12:32PM       Last Updated: 14Apr2021 12:33PM       Accession: [id number]

## 2021-05-24 NOTE — ASSESSMENT
[FreeTextEntry1] : 71 y/o F with right index finger mucous cyst and left hand Keinbock's disease now 6 weeks s/p excision of mucous cyst. Doing well with early cellulitis, improved after PO abx \par \par - XRay right index finger\par - f/u 2-3 weeks; if XR negative may f/u PRN\par \par Due to COVID 19, pre-visit patient instructions were explained to the patient and their symptoms were checked upon arrival.  \par Masks were used by the health care providers and staff and the examination room was cleaned after the patient visit was completed.\par

## 2021-05-26 ENCOUNTER — OUTPATIENT (OUTPATIENT)
Dept: OUTPATIENT SERVICES | Facility: HOSPITAL | Age: 71
LOS: 1 days | Discharge: HOME | End: 2021-05-26
Payer: COMMERCIAL

## 2021-05-26 ENCOUNTER — RESULT REVIEW (OUTPATIENT)
Age: 71
End: 2021-05-26

## 2021-05-26 DIAGNOSIS — Z96.651 PRESENCE OF RIGHT ARTIFICIAL KNEE JOINT: Chronic | ICD-10-CM

## 2021-05-26 DIAGNOSIS — Z96.659 PRESENCE OF UNSPECIFIED ARTIFICIAL KNEE JOINT: Chronic | ICD-10-CM

## 2021-05-26 DIAGNOSIS — M67.449 GANGLION, UNSPECIFIED HAND: ICD-10-CM

## 2021-05-26 PROCEDURE — 73120 X-RAY EXAM OF HAND: CPT | Mod: 26,RT

## 2021-06-27 ENCOUNTER — RX RENEWAL (OUTPATIENT)
Age: 71
End: 2021-06-27

## 2021-06-29 ENCOUNTER — APPOINTMENT (OUTPATIENT)
Dept: PLASTIC SURGERY | Facility: CLINIC | Age: 71
End: 2021-06-29
Payer: COMMERCIAL

## 2021-06-29 PROCEDURE — 99024 POSTOP FOLLOW-UP VISIT: CPT

## 2021-06-29 PROCEDURE — 99212 OFFICE O/P EST SF 10 MIN: CPT | Mod: 1L

## 2021-07-27 ENCOUNTER — APPOINTMENT (OUTPATIENT)
Dept: PLASTIC SURGERY | Facility: CLINIC | Age: 71
End: 2021-07-27

## 2021-08-18 ENCOUNTER — TRANSCRIPTION ENCOUNTER (OUTPATIENT)
Age: 71
End: 2021-08-18

## 2021-08-25 ENCOUNTER — OUTPATIENT (OUTPATIENT)
Dept: OUTPATIENT SERVICES | Facility: HOSPITAL | Age: 71
LOS: 1 days | Discharge: HOME | End: 2021-08-25
Payer: COMMERCIAL

## 2021-08-25 DIAGNOSIS — M25.572 PAIN IN LEFT ANKLE AND JOINTS OF LEFT FOOT: ICD-10-CM

## 2021-08-25 DIAGNOSIS — Z96.651 PRESENCE OF RIGHT ARTIFICIAL KNEE JOINT: Chronic | ICD-10-CM

## 2021-08-25 DIAGNOSIS — Z96.659 PRESENCE OF UNSPECIFIED ARTIFICIAL KNEE JOINT: Chronic | ICD-10-CM

## 2021-08-25 PROCEDURE — 73610 X-RAY EXAM OF ANKLE: CPT | Mod: 26,LT

## 2021-08-25 PROCEDURE — 73630 X-RAY EXAM OF FOOT: CPT | Mod: 26,LT

## 2021-09-16 LAB
ALBUMIN SERPL ELPH-MCNC: 3.9 G/DL
ALP BLD-CCNC: 103 U/L
ALT SERPL-CCNC: 16 U/L
ANION GAP SERPL CALC-SCNC: 11 MMOL/L
AST SERPL-CCNC: 18 U/L
BASOPHILS # BLD AUTO: 0.05 K/UL
BASOPHILS NFR BLD AUTO: 0.7 %
BILIRUB SERPL-MCNC: 0.4 MG/DL
BUN SERPL-MCNC: 12 MG/DL
CALCIUM SERPL-MCNC: 9.2 MG/DL
CHLORIDE SERPL-SCNC: 102 MMOL/L
CHOLEST SERPL-MCNC: 144 MG/DL
CO2 SERPL-SCNC: 25 MMOL/L
CREAT SERPL-MCNC: 0.7 MG/DL
EOSINOPHIL # BLD AUTO: 0.56 K/UL
EOSINOPHIL NFR BLD AUTO: 7.9 %
GLUCOSE SERPL-MCNC: 93 MG/DL
HCT VFR BLD CALC: 46.9 %
HDLC SERPL-MCNC: 46 MG/DL
HGB BLD-MCNC: 15.2 G/DL
IMM GRANULOCYTES NFR BLD AUTO: 0.3 %
LDLC SERPL CALC-MCNC: 88 MG/DL
LYMPHOCYTES # BLD AUTO: 1.55 K/UL
LYMPHOCYTES NFR BLD AUTO: 21.9 %
MAN DIFF?: NORMAL
MCHC RBC-ENTMCNC: 29.7 PG
MCHC RBC-ENTMCNC: 32.4 G/DL
MCV RBC AUTO: 91.6 FL
MONOCYTES # BLD AUTO: 0.68 K/UL
MONOCYTES NFR BLD AUTO: 9.6 %
NEUTROPHILS # BLD AUTO: 4.22 K/UL
NEUTROPHILS NFR BLD AUTO: 59.6 %
NONHDLC SERPL-MCNC: 98 MG/DL
PLATELET # BLD AUTO: 277 K/UL
POTASSIUM SERPL-SCNC: 4.5 MMOL/L
PROT SERPL-MCNC: 6.9 G/DL
RBC # BLD: 5.12 M/UL
RBC # FLD: 13.3 %
SODIUM SERPL-SCNC: 138 MMOL/L
TRIGL SERPL-MCNC: 80 MG/DL
WBC # FLD AUTO: 7.08 K/UL

## 2021-09-17 ENCOUNTER — APPOINTMENT (OUTPATIENT)
Dept: CARDIOLOGY | Facility: CLINIC | Age: 71
End: 2021-09-17
Payer: COMMERCIAL

## 2021-09-17 VITALS — WEIGHT: 185 LBS | TEMPERATURE: 97.3 F | HEART RATE: 75 BPM | HEIGHT: 62 IN | BODY MASS INDEX: 34.04 KG/M2

## 2021-09-17 VITALS — SYSTOLIC BLOOD PRESSURE: 138 MMHG | DIASTOLIC BLOOD PRESSURE: 80 MMHG

## 2021-09-17 PROCEDURE — 93000 ELECTROCARDIOGRAM COMPLETE: CPT

## 2021-09-17 PROCEDURE — 99214 OFFICE O/P EST MOD 30 MIN: CPT

## 2021-09-17 RX ORDER — DOXYCYCLINE HYCLATE 100 MG/1
100 TABLET ORAL
Qty: 10 | Refills: 0 | Status: DISCONTINUED | COMMUNITY
Start: 2021-05-04 | End: 2021-09-17

## 2021-09-17 NOTE — ASSESSMENT
[FreeTextEntry1] : The patient has no had chest pain or SOB . She has had gingival hyperplasia . Has has frequent urination so a diuretic may not be the ideal replacement .

## 2021-09-17 NOTE — HISTORY OF PRESENT ILLNESS
[FreeTextEntry1] : No chest pain or shortness of breath.  The patient has not had chest pain or SOB .  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

## 2021-09-17 NOTE — REASON FOR VISIT
[CV Risk Factors and Non-Cardiac Disease] : CV risk factors and non-cardiac disease [Hyperlipidemia] : hyperlipidemia [Follow-Up - Clinic] : a clinic follow-up of [Hypertension] : hypertension [FreeTextEntry3] : Dr. Bermeo

## 2021-09-20 RX ORDER — NEBIVOLOL 2.5 MG/1
2.5 TABLET ORAL
Qty: 90 | Refills: 3 | Status: DISCONTINUED | COMMUNITY
Start: 2021-09-17 | End: 2021-09-20

## 2021-09-21 ENCOUNTER — RESULT REVIEW (OUTPATIENT)
Age: 71
End: 2021-09-21

## 2021-09-21 ENCOUNTER — OUTPATIENT (OUTPATIENT)
Dept: OUTPATIENT SERVICES | Facility: HOSPITAL | Age: 71
LOS: 1 days | Discharge: HOME | End: 2021-09-21
Payer: COMMERCIAL

## 2021-09-21 DIAGNOSIS — Z12.31 ENCOUNTER FOR SCREENING MAMMOGRAM FOR MALIGNANT NEOPLASM OF BREAST: ICD-10-CM

## 2021-09-21 DIAGNOSIS — Z96.659 PRESENCE OF UNSPECIFIED ARTIFICIAL KNEE JOINT: Chronic | ICD-10-CM

## 2021-09-21 DIAGNOSIS — Z96.651 PRESENCE OF RIGHT ARTIFICIAL KNEE JOINT: Chronic | ICD-10-CM

## 2021-09-21 PROCEDURE — 77063 BREAST TOMOSYNTHESIS BI: CPT | Mod: 26

## 2021-09-21 PROCEDURE — 77067 SCR MAMMO BI INCL CAD: CPT | Mod: 26

## 2021-09-24 ENCOUNTER — NON-APPOINTMENT (OUTPATIENT)
Age: 71
End: 2021-09-24

## 2021-10-02 ENCOUNTER — TRANSCRIPTION ENCOUNTER (OUTPATIENT)
Age: 71
End: 2021-10-02

## 2021-10-05 ENCOUNTER — TRANSCRIPTION ENCOUNTER (OUTPATIENT)
Age: 71
End: 2021-10-05

## 2021-10-14 ENCOUNTER — RESULT REVIEW (OUTPATIENT)
Age: 71
End: 2021-10-14

## 2021-10-14 ENCOUNTER — OUTPATIENT (OUTPATIENT)
Dept: OUTPATIENT SERVICES | Facility: HOSPITAL | Age: 71
LOS: 1 days | Discharge: HOME | End: 2021-10-14
Payer: COMMERCIAL

## 2021-10-14 DIAGNOSIS — Z96.651 PRESENCE OF RIGHT ARTIFICIAL KNEE JOINT: Chronic | ICD-10-CM

## 2021-10-14 DIAGNOSIS — Z96.659 PRESENCE OF UNSPECIFIED ARTIFICIAL KNEE JOINT: Chronic | ICD-10-CM

## 2021-10-14 DIAGNOSIS — R92.8 OTHER ABNORMAL AND INCONCLUSIVE FINDINGS ON DIAGNOSTIC IMAGING OF BREAST: ICD-10-CM

## 2021-10-14 PROCEDURE — 77061 BREAST TOMOSYNTHESIS UNI: CPT | Mod: 26

## 2021-10-14 PROCEDURE — 77065 DX MAMMO INCL CAD UNI: CPT | Mod: 26,LT

## 2021-10-21 ENCOUNTER — APPOINTMENT (OUTPATIENT)
Dept: CARDIOLOGY | Facility: CLINIC | Age: 71
End: 2021-10-21
Payer: COMMERCIAL

## 2021-10-21 VITALS
SYSTOLIC BLOOD PRESSURE: 158 MMHG | TEMPERATURE: 96.9 F | HEIGHT: 62 IN | DIASTOLIC BLOOD PRESSURE: 80 MMHG | WEIGHT: 186 LBS | BODY MASS INDEX: 34.23 KG/M2

## 2021-10-21 PROCEDURE — 99214 OFFICE O/P EST MOD 30 MIN: CPT

## 2021-10-21 NOTE — ASSESSMENT
[FreeTextEntry1] : The patient has not tolerated Metoprolol Since she had started this she has had KIMBLE and lightheadedness . She has had hailey CP . She had weaned down to 12.5 mg daily  .

## 2021-10-21 NOTE — HISTORY OF PRESENT ILLNESS
[FreeTextEntry1] : The patient has had lightheadedness and SOB since starting Metoprolol . No chest pain

## 2021-10-22 ENCOUNTER — RESULT REVIEW (OUTPATIENT)
Age: 71
End: 2021-10-22

## 2021-10-22 ENCOUNTER — OUTPATIENT (OUTPATIENT)
Dept: OUTPATIENT SERVICES | Facility: HOSPITAL | Age: 71
LOS: 1 days | Discharge: HOME | End: 2021-10-22
Payer: COMMERCIAL

## 2021-10-22 DIAGNOSIS — Z96.659 PRESENCE OF UNSPECIFIED ARTIFICIAL KNEE JOINT: Chronic | ICD-10-CM

## 2021-10-22 DIAGNOSIS — Z96.651 PRESENCE OF RIGHT ARTIFICIAL KNEE JOINT: Chronic | ICD-10-CM

## 2021-10-22 PROCEDURE — 76098 X-RAY EXAM SURGICAL SPECIMEN: CPT | Mod: 26

## 2021-10-22 PROCEDURE — 19081 BX BREAST 1ST LESION STRTCTC: CPT | Mod: LT

## 2021-10-22 PROCEDURE — 88305 TISSUE EXAM BY PATHOLOGIST: CPT | Mod: 26

## 2021-10-25 LAB — SURGICAL PATHOLOGY STUDY: SIGNIFICANT CHANGE UP

## 2021-10-29 DIAGNOSIS — D24.2 BENIGN NEOPLASM OF LEFT BREAST: ICD-10-CM

## 2021-10-29 DIAGNOSIS — N63.20 UNSPECIFIED LUMP IN THE LEFT BREAST, UNSPECIFIED QUADRANT: ICD-10-CM

## 2021-11-30 ENCOUNTER — APPOINTMENT (OUTPATIENT)
Dept: CARDIOLOGY | Facility: CLINIC | Age: 71
End: 2021-11-30
Payer: COMMERCIAL

## 2021-11-30 PROCEDURE — 93351 STRESS TTE COMPLETE: CPT

## 2021-11-30 PROCEDURE — 93320 DOPPLER ECHO COMPLETE: CPT

## 2021-11-30 PROCEDURE — 93325 DOPPLER ECHO COLOR FLOW MAPG: CPT

## 2021-12-07 ENCOUNTER — APPOINTMENT (OUTPATIENT)
Dept: CARDIOLOGY | Facility: CLINIC | Age: 71
End: 2021-12-07
Payer: COMMERCIAL

## 2021-12-07 PROCEDURE — 99214 OFFICE O/P EST MOD 30 MIN: CPT

## 2021-12-07 RX ORDER — AMLODIPINE BESYLATE 5 MG/1
5 TABLET ORAL
Refills: 0 | Status: DISCONTINUED | COMMUNITY
End: 2021-12-07

## 2021-12-07 RX ORDER — METOPROLOL SUCCINATE 25 MG/1
25 TABLET, EXTENDED RELEASE ORAL DAILY
Qty: 90 | Refills: 3 | Status: DISCONTINUED | COMMUNITY
Start: 2021-09-20 | End: 2021-12-07

## 2021-12-07 NOTE — CARDIOLOGY SUMMARY
[___] : [unfilled] [de-identified] : 9- NSR Normal ECG .  [de-identified] : 11- ETT Echo completed 3 minutes No ischemia . trace MR and TR

## 2021-12-07 NOTE — ASSESSMENT
[FreeTextEntry1] : The patient has had excellent control of her BP once restarted on amlodipine . BP increased without this . She had unfortunately developed gingival hyperplasia from this medication . ETT echo was negative at >85% PMHR but low exercise load . No chest pain .

## 2021-12-07 NOTE — HISTORY OF PRESENT ILLNESS
[FreeTextEntry1] : The patient feel better on amlodipine rather than Metoprolol The patient had a stress echo . She had completed 3 minutes and no ischemia

## 2022-01-12 LAB
ANION GAP SERPL CALC-SCNC: 13 MMOL/L
BUN SERPL-MCNC: 17 MG/DL
CALCIUM SERPL-MCNC: 9.5 MG/DL
CHLORIDE SERPL-SCNC: 103 MMOL/L
CO2 SERPL-SCNC: 25 MMOL/L
CREAT SERPL-MCNC: 0.9 MG/DL
GLUCOSE SERPL-MCNC: 101 MG/DL
POTASSIUM SERPL-SCNC: 4 MMOL/L
SODIUM SERPL-SCNC: 141 MMOL/L

## 2022-01-13 ENCOUNTER — APPOINTMENT (OUTPATIENT)
Dept: CARDIOLOGY | Facility: CLINIC | Age: 72
End: 2022-01-13

## 2022-02-09 ENCOUNTER — APPOINTMENT (OUTPATIENT)
Dept: CARDIOLOGY | Facility: CLINIC | Age: 72
End: 2022-02-09
Payer: COMMERCIAL

## 2022-02-09 VITALS
HEIGHT: 62 IN | HEART RATE: 74 BPM | SYSTOLIC BLOOD PRESSURE: 150 MMHG | BODY MASS INDEX: 34.04 KG/M2 | DIASTOLIC BLOOD PRESSURE: 80 MMHG | WEIGHT: 185 LBS

## 2022-02-09 PROCEDURE — 93000 ELECTROCARDIOGRAM COMPLETE: CPT

## 2022-02-09 PROCEDURE — 99214 OFFICE O/P EST MOD 30 MIN: CPT

## 2022-02-09 NOTE — ASSESSMENT
[FreeTextEntry1] : The patient has better control of BP with HCTZ+ Losartan . No chest pain . She is not good with diet as well . Told improtance of staying on a low salt diet .

## 2022-02-09 NOTE — CARDIOLOGY SUMMARY
[___] : [unfilled] [de-identified] : 9- NSR Normal ECG . \par 2-9-2022  NSR LVH NS  twave change [de-identified] : 11- ETT Echo completed 3 minutes No ischemia . trace MR and TR

## 2022-02-09 NOTE — HISTORY OF PRESENT ILLNESS
[FreeTextEntry1] : The patient is tolerating HCTZ . Failr control of her BP but has not been compliant with her low salt diet . She eats potato chips and french fries .

## 2022-04-14 ENCOUNTER — APPOINTMENT (OUTPATIENT)
Dept: CARDIOLOGY | Facility: CLINIC | Age: 72
End: 2022-04-14
Payer: COMMERCIAL

## 2022-04-14 VITALS
DIASTOLIC BLOOD PRESSURE: 70 MMHG | HEIGHT: 62 IN | HEART RATE: 81 BPM | SYSTOLIC BLOOD PRESSURE: 140 MMHG | WEIGHT: 184 LBS | BODY MASS INDEX: 33.86 KG/M2

## 2022-04-14 PROCEDURE — 93000 ELECTROCARDIOGRAM COMPLETE: CPT

## 2022-04-14 PROCEDURE — 99214 OFFICE O/P EST MOD 30 MIN: CPT

## 2022-04-14 RX ORDER — HYDROCHLOROTHIAZIDE 12.5 MG/1
12.5 TABLET ORAL DAILY
Qty: 30 | Refills: 6 | Status: DISCONTINUED | COMMUNITY
Start: 2021-12-07 | End: 2022-04-14

## 2022-04-14 RX ORDER — ZOLPIDEM TARTRATE 5 MG/1
5 TABLET, FILM COATED ORAL
Refills: 0 | Status: ACTIVE | COMMUNITY

## 2022-04-14 NOTE — CARDIOLOGY SUMMARY
[___] : [unfilled] [de-identified] : 9- NSR Normal ECG . \par 2-9-2022  NSR LVH NS  twave change\par 4- NSR NS  twave change  LVH  [de-identified] : 11- ETT Echo completed 3 minutes No ischemia . trace MR and TR

## 2022-04-14 NOTE — PHYSICAL EXAM
[General Appearance - Well Developed] : well developed [Normal Appearance] : normal appearance [Well Groomed] : well groomed [General Appearance - Well Nourished] : well nourished [No Deformities] : no deformities [General Appearance - In No Acute Distress] : no acute distress [Normal Conjunctiva] : the conjunctiva exhibited no abnormalities [Eyelids - No Xanthelasma] : the eyelids demonstrated no xanthelasmas [Normal Oral Mucosa] : normal oral mucosa [No Oral Pallor] : no oral pallor [No Oral Cyanosis] : no oral cyanosis [Respiration, Rhythm And Depth] : normal respiratory rhythm and effort [Exaggerated Use Of Accessory Muscles For Inspiration] : no accessory muscle use [Auscultation Breath Sounds / Voice Sounds] : lungs were clear to auscultation bilaterally [Abdomen Soft] : soft [Abdomen Tenderness] : non-tender [Abdomen Mass (___ Cm)] : no abdominal mass palpated [Abnormal Walk] : normal gait [Gait - Sufficient For Exercise Testing] : the gait was sufficient for exercise testing [Nail Clubbing] : no clubbing of the fingernails [Cyanosis, Localized] : no localized cyanosis [Petechial Hemorrhages (___cm)] : no petechial hemorrhages [Skin Color & Pigmentation] : normal skin color and pigmentation [] : no rash [Skin Lesions] : no skin lesions [No Venous Stasis] : no venous stasis [No Skin Ulcers] : no skin ulcer [No Xanthoma] : no  xanthoma was observed [Oriented To Time, Place, And Person] : oriented to person, place, and time [Affect] : the affect was normal [Mood] : the mood was normal [No Anxiety] : not feeling anxious [Normal Rate] : normal [Rhythm Regular] : regular [No Murmur] : no murmurs heard [2+] : left 2+ [No Pitting Edema] : no pitting edema present [FreeTextEntry1] : No JVD  [Rt] : no varicose veins of the right leg [Lt] : no varicose veins of the left leg

## 2022-04-14 NOTE — ASSESSMENT
[FreeTextEntry1] : The patient has had an episode of projectile vomiting after having a large meal. No CP . She has hSOB since having Covid . She has not bee eating too much today . She was haivng fluttering in her chest prior to her GI symptoms and seems to be related to eating as it does not occur at other time . She feels weak but she had extensive vomiting 2 nights ago . She had no fever or diarrhea . She has known GERD as well.  ECG shows no acute changes  .

## 2022-04-14 NOTE — HISTORY OF PRESENT ILLNESS
[FreeTextEntry1] : The patient has had SOB . the patient went out to eat after a steak dinner and clams and wine . She felt fluttering in her chest and dupsequently had projectile vomiting . . She has had fluttering in her chest only after eating. She has not had furhter vomiting however . She has felt weak .

## 2022-04-19 ENCOUNTER — APPOINTMENT (OUTPATIENT)
Dept: CARDIOLOGY | Facility: CLINIC | Age: 72
End: 2022-04-19
Payer: MEDICARE

## 2022-04-19 PROCEDURE — 93244 EXT ECG>48HR<7D REV&INTERPJ: CPT

## 2022-04-21 ENCOUNTER — APPOINTMENT (OUTPATIENT)
Dept: CARDIOLOGY | Facility: CLINIC | Age: 72
End: 2022-04-21
Payer: COMMERCIAL

## 2022-04-21 PROCEDURE — 93306 TTE W/DOPPLER COMPLETE: CPT

## 2022-04-24 LAB
ALBUMIN SERPL ELPH-MCNC: 4 G/DL
ALP BLD-CCNC: 78 U/L
ALT SERPL-CCNC: 21 U/L
ANION GAP SERPL CALC-SCNC: 13 MMOL/L
AST SERPL-CCNC: 22 U/L
BASOPHILS # BLD AUTO: 0.07 K/UL
BASOPHILS NFR BLD AUTO: 1.6 %
BILIRUB SERPL-MCNC: 0.2 MG/DL
BUN SERPL-MCNC: 16 MG/DL
CALCIUM SERPL-MCNC: 9.5 MG/DL
CHLORIDE SERPL-SCNC: 103 MMOL/L
CHOLEST SERPL-MCNC: 168 MG/DL
CO2 SERPL-SCNC: 24 MMOL/L
CREAT SERPL-MCNC: 0.9 MG/DL
DEPRECATED D DIMER PPP IA-ACNC: 325 NG/ML DDU
EGFR: 69 ML/MIN/1.73M2
EOSINOPHIL # BLD AUTO: 0.38 K/UL
EOSINOPHIL NFR BLD AUTO: 8.5 %
ESTIMATED AVERAGE GLUCOSE: 117 MG/DL
GLUCOSE SERPL-MCNC: 104 MG/DL
HBA1C MFR BLD HPLC: 5.7 %
HCT VFR BLD CALC: 45.3 %
HDLC SERPL-MCNC: 41 MG/DL
HGB BLD-MCNC: 14.9 G/DL
IMM GRANULOCYTES NFR BLD AUTO: 0.2 %
LDLC SERPL CALC-MCNC: 103 MG/DL
LYMPHOCYTES # BLD AUTO: 1.44 K/UL
LYMPHOCYTES NFR BLD AUTO: 32.3 %
MAN DIFF?: NORMAL
MCHC RBC-ENTMCNC: 29.6 PG
MCHC RBC-ENTMCNC: 32.9 G/DL
MCV RBC AUTO: 90.1 FL
MONOCYTES # BLD AUTO: 0.66 K/UL
MONOCYTES NFR BLD AUTO: 14.8 %
NEUTROPHILS # BLD AUTO: 1.9 K/UL
NEUTROPHILS NFR BLD AUTO: 42.6 %
NONHDLC SERPL-MCNC: 127 MG/DL
PLATELET # BLD AUTO: 243 K/UL
POTASSIUM SERPL-SCNC: 4.2 MMOL/L
PROT SERPL-MCNC: 6.7 G/DL
RBC # BLD: 5.03 M/UL
RBC # FLD: 13.3 %
SODIUM SERPL-SCNC: 140 MMOL/L
TRIGL SERPL-MCNC: 121 MG/DL
WBC # FLD AUTO: 4.46 K/UL

## 2022-04-25 ENCOUNTER — APPOINTMENT (OUTPATIENT)
Dept: VASCULAR SURGERY | Facility: CLINIC | Age: 72
End: 2022-04-25
Payer: COMMERCIAL

## 2022-04-25 ENCOUNTER — RESULT REVIEW (OUTPATIENT)
Age: 72
End: 2022-04-25

## 2022-04-25 PROCEDURE — 93970 EXTREMITY STUDY: CPT

## 2022-04-27 ENCOUNTER — OUTPATIENT (OUTPATIENT)
Dept: OUTPATIENT SERVICES | Facility: HOSPITAL | Age: 72
LOS: 1 days | Discharge: HOME | End: 2022-04-27
Payer: COMMERCIAL

## 2022-04-27 DIAGNOSIS — Z96.659 PRESENCE OF UNSPECIFIED ARTIFICIAL KNEE JOINT: Chronic | ICD-10-CM

## 2022-04-27 DIAGNOSIS — M67.449 GANGLION, UNSPECIFIED HAND: ICD-10-CM

## 2022-04-27 DIAGNOSIS — Z96.651 PRESENCE OF RIGHT ARTIFICIAL KNEE JOINT: Chronic | ICD-10-CM

## 2022-04-27 PROCEDURE — 71275 CT ANGIOGRAPHY CHEST: CPT | Mod: 26

## 2022-05-25 ENCOUNTER — APPOINTMENT (OUTPATIENT)
Dept: CARDIOLOGY | Facility: CLINIC | Age: 72
End: 2022-05-25

## 2022-06-04 ENCOUNTER — RX RENEWAL (OUTPATIENT)
Age: 72
End: 2022-06-04

## 2022-06-07 ENCOUNTER — APPOINTMENT (OUTPATIENT)
Age: 72
End: 2022-06-07
Payer: MEDICARE

## 2022-06-07 VITALS
DIASTOLIC BLOOD PRESSURE: 70 MMHG | WEIGHT: 184 LBS | HEIGHT: 62 IN | OXYGEN SATURATION: 99 % | SYSTOLIC BLOOD PRESSURE: 130 MMHG | BODY MASS INDEX: 33.86 KG/M2 | HEART RATE: 85 BPM | RESPIRATION RATE: 14 BRPM

## 2022-06-07 DIAGNOSIS — Z82.3 FAMILY HISTORY OF STROKE: ICD-10-CM

## 2022-06-07 DIAGNOSIS — Z86.39 PERSONAL HISTORY OF OTHER ENDOCRINE, NUTRITIONAL AND METABOLIC DISEASE: ICD-10-CM

## 2022-06-07 DIAGNOSIS — Z82.49 FAMILY HISTORY OF ISCHEMIC HEART DISEASE AND OTHER DISEASES OF THE CIRCULATORY SYSTEM: ICD-10-CM

## 2022-06-07 DIAGNOSIS — Z86.79 PERSONAL HISTORY OF OTHER DISEASES OF THE CIRCULATORY SYSTEM: ICD-10-CM

## 2022-06-07 PROCEDURE — 99204 OFFICE O/P NEW MOD 45 MIN: CPT | Mod: 25

## 2022-06-07 PROCEDURE — 71046 X-RAY EXAM CHEST 2 VIEWS: CPT

## 2022-06-07 NOTE — PHYSICAL EXAM
[No Acute Distress] : no acute distress [IV] : Mallampati Class: IV [Normal Appearance] : normal appearance [No Neck Mass] : no neck mass [Normal Rate/Rhythm] : normal rate/rhythm [Normal S1, S2] : normal s1, s2 [No Murmurs] : no murmurs [No Resp Distress] : no resp distress [Clear to Auscultation Bilaterally] : clear to auscultation bilaterally [No Abnormalities] : no abnormalities [No Clubbing] : no clubbing [No Cyanosis] : no cyanosis [No Edema] : no edema [FROM] : FROM [Normal Color/ Pigmentation] : normal color/ pigmentation [No Focal Deficits] : no focal deficits [Oriented x3] : oriented x3 [Normal Affect] : normal affect

## 2022-06-07 NOTE — PROCEDURE
[FreeTextEntry1] : Reason : wheezing \par Overall Findings:\par \par View Ap and lateral  \par \par \par Lung Fields:\par \par Normal Lung Markings                   No Infiltrate\par \par Pleura:\par \par No Pleural Effusions                      \par \par Comparison Films\par \par Unchanged        when compare to [facility] \par \par final Interpretation: no acute evidence of pulmonary disease

## 2022-06-07 NOTE — REVIEW OF SYSTEMS
[Fatigue] : fatigue [Wheezing] : wheezing [SOB on Exertion] : sob on exertion [GERD] : gerd [Negative] : Endocrine

## 2022-08-14 LAB
ALBUMIN SERPL ELPH-MCNC: 4 G/DL
ALP BLD-CCNC: 92 U/L
ALT SERPL-CCNC: 17 U/L
ANION GAP SERPL CALC-SCNC: 11 MMOL/L
AST SERPL-CCNC: 18 U/L
BASOPHILS # BLD AUTO: 0.07 K/UL
BASOPHILS NFR BLD AUTO: 1 %
BILIRUB SERPL-MCNC: 0.4 MG/DL
BUN SERPL-MCNC: 17 MG/DL
CALCIUM SERPL-MCNC: 9.3 MG/DL
CHLORIDE SERPL-SCNC: 102 MMOL/L
CHOLEST SERPL-MCNC: 218 MG/DL
CO2 SERPL-SCNC: 27 MMOL/L
CREAT SERPL-MCNC: 0.9 MG/DL
EGFR: 69 ML/MIN/1.73M2
EOSINOPHIL # BLD AUTO: 0.58 K/UL
EOSINOPHIL NFR BLD AUTO: 8 %
GLUCOSE SERPL-MCNC: 99 MG/DL
HCT VFR BLD CALC: 47.1 %
HDLC SERPL-MCNC: 41 MG/DL
HGB BLD-MCNC: 14.9 G/DL
IMM GRANULOCYTES NFR BLD AUTO: 0.3 %
LDLC SERPL CALC-MCNC: 149 MG/DL
LYMPHOCYTES # BLD AUTO: 2.28 K/UL
LYMPHOCYTES NFR BLD AUTO: 31.6 %
MAN DIFF?: NORMAL
MCHC RBC-ENTMCNC: 28.7 PG
MCHC RBC-ENTMCNC: 31.6 G/DL
MCV RBC AUTO: 90.6 FL
MONOCYTES # BLD AUTO: 0.75 K/UL
MONOCYTES NFR BLD AUTO: 10.4 %
NEUTROPHILS # BLD AUTO: 3.52 K/UL
NEUTROPHILS NFR BLD AUTO: 48.7 %
NONHDLC SERPL-MCNC: 177 MG/DL
PLATELET # BLD AUTO: 285 K/UL
POTASSIUM SERPL-SCNC: 4 MMOL/L
PROT SERPL-MCNC: 6.7 G/DL
RBC # BLD: 5.2 M/UL
RBC # FLD: 13.5 %
SODIUM SERPL-SCNC: 140 MMOL/L
TRIGL SERPL-MCNC: 139 MG/DL
WBC # FLD AUTO: 7.22 K/UL

## 2022-08-16 ENCOUNTER — APPOINTMENT (OUTPATIENT)
Dept: CARDIOLOGY | Facility: CLINIC | Age: 72
End: 2022-08-16

## 2022-08-16 VITALS
BODY MASS INDEX: 34.23 KG/M2 | DIASTOLIC BLOOD PRESSURE: 70 MMHG | HEART RATE: 73 BPM | TEMPERATURE: 96.8 F | HEIGHT: 62 IN | WEIGHT: 186 LBS | SYSTOLIC BLOOD PRESSURE: 120 MMHG

## 2022-08-16 PROCEDURE — 93000 ELECTROCARDIOGRAM COMPLETE: CPT

## 2022-08-16 PROCEDURE — 99214 OFFICE O/P EST MOD 30 MIN: CPT | Mod: 25

## 2022-08-16 RX ORDER — PANTOPRAZOLE 40 MG/1
40 TABLET, DELAYED RELEASE ORAL
Qty: 30 | Refills: 6 | Status: DISCONTINUED | COMMUNITY
Start: 2022-04-14 | End: 2022-08-16

## 2022-08-16 RX ORDER — MULTIVIT-MIN/IRON/FOLIC ACID/K 18-600-40
CAPSULE ORAL
Refills: 0 | Status: DISCONTINUED | COMMUNITY
End: 2022-08-16

## 2022-08-16 RX ORDER — BUDESONIDE AND FORMOTEROL FUMARATE DIHYDRATE 80; 4.5 UG/1; UG/1
80-4.5 AEROSOL RESPIRATORY (INHALATION)
Qty: 1 | Refills: 2 | Status: DISCONTINUED | COMMUNITY
Start: 2022-06-07 | End: 2022-08-16

## 2022-08-16 NOTE — CARDIOLOGY SUMMARY
[___] : [unfilled] [de-identified] : 9- NSR Normal ECG . \par 2-9-2022  NSR LVH NS  twave change\par 4- NSR NS  twave change  LVH \par 8- NSR Normal ECG  [de-identified] : 4- EPATCH No arrhythmias .  [de-identified] : 11- ETT Echo completed 3 minutes No ischemia . trace MR and TR  [de-identified] : 4- Normal Lv systolic functin Mild MR trace TR

## 2022-08-16 NOTE — ASSESSMENT
[FreeTextEntry1] : The patient has had improved symptoms . Seen by pulmonary . CTA showed no PE . the patient has had no chest pain . LDL is not at shahab and she was told to restart Pravastatin . the patient has had a negative ischemia work up in the past and has normal Lv systolic function on last echo

## 2022-08-16 NOTE — HISTORY OF PRESENT ILLNESS
[FreeTextEntry1] : The patient has had no further vomiting . Has some KIMBLE . No chest pain . Had seen pulmonary and they had requested a PFT. She did not take statins . The patient had CTA done no PE and venous doppler of legs  No DVT

## 2022-08-22 NOTE — H&P PST ADULT - DOES PATIENT HAVE ADVANCE DIRECTIVE
Ambulatory Care Coordination Note  8/22/2022    ACC: Cherrie Pastrana, RN    Summary Note: Pt reports he has heard nothing about Lisinopril refill from Cardiology - This RN left a message 8/16/22 about refill, pt has heard nothing. [facility] Cardiology again - updated on need for Lisinopril RX refill. Discussed recent PCP visit, need for Podiatrist f/u - pt aware [facility] will be calling. Lab Results       None            Care Coordination Interventions    Referral from Primary Care Provider: No  Suggested Interventions and Community Resources  Social Work: In Process          Goals Addressed                   This Visit's Progress     Care Coordination Self Management   On track     CC Self Management Goal  Patient Goal   Patient is able to discuss self-management of condition(s): CHF, DM, CKD, GERD  Pt demonstrates adherence to medications  Pt demonstrates understanding of self-monitoring  Patient is able to identify Red Flags:  Alert to potential adverse drug reactions(s) or side effects and actions to take should they arise  Discuss target symptoms and actions to take should they arise  Identify problems that require immediate PCP or specialist visit  Patient demonstrates understanding of access to PCP/Specialist:  Understands about scheduling routine Follow Up appointments   Understands about sick day appointment options for worsening of symptoms/progression (Same Day, E Visits)              Prior to Admission medications    Medication Sig Start Date End Date Taking? Authorizing Provider   gabapentin (NEURONTIN) 300 MG capsule Take 1 capsule by mouth in the morning and at bedtime for 60 days. 7/15/22 9/13/22  HAWK Lyon CNP   HYDROcodone-acetaminophen (NORCO)  MG per tablet Take 1 tablet by mouth every 8 hours as needed.  6/29/21   Historical Provider, MD   lisinopril (PRINIVIL;ZESTRIL) 40 MG tablet TAKE 1 TABLET BY MOUTH DAILY 4/5/22   Historical Provider, MD   aspirin 81 MG chewable tablet Take 81 mg by mouth daily 4/4/22   Historical Provider, MD   carvedilol (COREG) 25 MG tablet Take 25 mg by mouth 2 times daily 5/11/22   Historical Provider, MD   glipiZIDE (GLUCOTROL) 5 MG tablet Take 5 mg by mouth daily 3/2/22   Historical Provider, MD   torsemide (DEMADEX) 20 MG tablet Take 20 mg by mouth 2 times daily 5/2/22   Historical Provider, MD   FEROSUL 325 (65 Fe) MG tablet TAKE 1 TABLET BY MOUTH DAILY WITH BREAKFAST 4/4/22   Historical Provider, MD   atorvastatin (LIPITOR) 40 MG tablet Take 40 mg by mouth daily    Historical Provider, MD   pantoprazole (PROTONIX) 40 MG injection Infuse 40 mg intravenously daily    Historical Provider, MD   tamsulosin (FLOMAX) 0.4 MG capsule Take 0.4 mg by mouth daily 8/21/21   Historical Provider, MD   potassium chloride 20 MEQ/15ML (10%) oral solution Take 20 mEq by mouth daily    Historical Provider, MD   albuterol-ipratropium (COMBIVENT RESPIMAT)  MCG/ACT AERS inhaler Inhale 1 puff into the lungs    Historical Provider, MD   SYMBICORT 80-4.5 MCG/ACT AERO INHALE 2 PUFFS BY MOUTH TWICE DAILY 4/4/22   Historical Provider, MD   allopurinol (ZYLOPRIM) 300 MG tablet TAKE 1 TABLET BY MOUTH DAILY 6/9/22   Historical Provider, MD   albuterol sulfate HFA (PROVENTIL;VENTOLIN;PROAIR) 108 (90 Base) MCG/ACT inhaler Inhale 2 puffs into the lungs every 4 hours as needed 3/2/22   Historical Provider, MD   nicotine (NICODERM CQ) 21 MG/24HR Place 1 patch onto the skin daily 6/22/22 8/3/22  Belkys Cash MD       Future Appointments   Date Time Provider Blue Gimenez   11/14/2022 10:30 AM DO DYLON Goldman AMB   11/18/2022  1:00 PM Oanh Goss, APRN - JESS[facility] AMB info provided

## 2022-09-08 ENCOUNTER — APPOINTMENT (OUTPATIENT)
Dept: PLASTIC SURGERY | Facility: CLINIC | Age: 72
End: 2022-09-08

## 2022-09-08 PROCEDURE — 20550 NJX 1 TENDON SHEATH/LIGAMENT: CPT | Mod: LT

## 2022-09-08 PROCEDURE — 99212 OFFICE O/P EST SF 10 MIN: CPT | Mod: 25

## 2022-09-08 NOTE — DATA REVIEWED
[FreeTextEntry1] : \par  Pathology             Final\par \par No Documents Attached\par \par \par \par \par   [facility] Accession Number : [id number]\par \par GIULIA POWER                   1\par \par \par \par Surgical Final Report\par \par \par \par Final Diagnosis\par Labelled as right index finger mucous cyst:\par - Fragments of osteocartilage and fibroconnective tissue\par suggestive of mucous\par cyst.\par \par Verified by: Danika Saenz MD\par (Electronic Signature)\par Reported on: 04/14/21 12:32 EDT, [address] [place] [address]\par Phone: [phone]   Fax: [phone]\par _________________________________________________________________\par \par Clinical History\par Excision\par Right index finger cyst x several months\par COVID (-)\par \par Specimen(s) Submitted\par Right index finger mucous cyst\par \par Gross Description\par The specimen is received in formalin, labeled "right index finger\par mucous cyst" and consists of multiple irregular pieces of tan to\par light brown soft tissue measuring 0.5 x 0.3 x 0.2 cm in\par aggregate. The specimen is submitted entirely. (1 block)\par \par Specimen was received and underwent gross examination at [facility], [address] [place] [address].\par \par 04/13/2021 09:05:22 EDT rr\par \par Perioperative Diagnosis\par Right index finger mucous cyst\par \par  \par \par  Ordered by: TERRI BERRY IV       Collected/Examined: 12Apr2021 09:00AM       \par Verification Required       Stage: Final       \par  Performed at: [facility]       Resulted: 14Apr2021 12:32PM       Last Updated: 14Apr2021 12:33PM       Accession: [id number]

## 2022-09-08 NOTE — ASSESSMENT
[FreeTextEntry1] : 69 y/o F with right index finger mucous cyst and left hand Keinbock's disease now 10 weeks s/p excision of mucous cyst. Doing well with early cellulitis, resolved after PO abx \par \par -OT for ROM\par -F/u 1 month\par \par Due to COVID 19, pre-visit patient instructions were explained to the patient and their symptoms were checked upon arrival.  \par Masks were used by the health care providers and staff and the examination room was cleaned after the patient visit was completed.\par \par \par \par \par \par

## 2022-09-08 NOTE — PHYSICAL EXAM
[de-identified] : well-appearing, NAD [de-identified] : bilateral hands with OA changes, normal digital cascade and FROM, SILT, negative Phalen's and Tinels, right index dorsal DIP incision well-healed, nontender, no open wounds or drainage, limited terminal flexion

## 2022-09-08 NOTE — PHYSICAL EXAM
[de-identified] : well-appearing, NAD [de-identified] : bilateral hands with OA changes, normal digital cascade and FROM, SILT, negative Phalen's and Tinels, right index dorsal DIP incision well-healed, nontender, no open wounds or drainage, limited terminal flexion

## 2022-09-08 NOTE — DATA REVIEWED
[FreeTextEntry1] : \par  Pathology             Final\par \par No Documents Attached\par \par \par \par \par   [facility] Accession Number : [id number]\par \par GIULIA POWER                   1\par \par \par \par Surgical Final Report\par \par \par \par Final Diagnosis\par Labelled as right index finger mucous cyst:\par - Fragments of osteocartilage and fibroconnective tissue\par suggestive of mucous\par cyst.\par \par Verified by: Danika Saenz MD\par (Electronic Signature)\par Reported on: 04/14/21 12:32 EDT, [address] [place] [address]\par Phone: [phone]   Fax: [phone]\par _________________________________________________________________\par \par Clinical History\par Excision\par Right index finger cyst x several months\par COVID (-)\par \par Specimen(s) Submitted\par Right index finger mucous cyst\par \par Gross Description\par The specimen is received in formalin, labeled "right index finger\par mucous cyst" and consists of multiple irregular pieces of tan to\par light brown soft tissue measuring 0.5 x 0.3 x 0.2 cm in\par aggregate. The specimen is submitted entirely. (1 block)\par \par Specimen was received and underwent gross examination at Health system, [address] [place] [address].\par \par 04/13/2021 09:05:22 EDT rr\par \par Perioperative Diagnosis\par Right index finger mucous cyst\par \par  \par \par  Ordered by: TERRI BERRY IV       Collected/Examined: 12Apr2021 09:00AM       \par Verification Required       Stage: Final       \par  Performed at: [facility]       Resulted: 14Apr2021 12:32PM       Last Updated: 14Apr2021 12:33PM       Accession: [id number]

## 2022-09-08 NOTE — HISTORY OF PRESENT ILLNESS
[FreeTextEntry1] : Pt is a 69 y/o F, RHD, with PMH of HTN, HLD, and insomnia who presents for evaluation of right index finger mass x2 months. Denies pain, bleeding, or drainage. No XRs done. Denies trauma.\par \par Also c/o left hand pain. Has been diagnosed with Keinbock's disease. C/o intermittent pain to dorsal hand that radiates up her arm and wakes her up at night. Denies paraesthesias but c/o weakness. Had EMG done several years ago.\par \par nonsmoker, nondiabetic\par Occ:  at [facility]\par \par Interval hx (4/19/21). Patient presents today POD#7 s/p excision of right index mucous cyst. Doing well c/o slight discomfort and swelling. Denies any f/c or bleeding. \par \par Interval hx (4/26/21): Pt presents today POD #14 s/p excision of right index mucous cyst. C/o mild incisional tenderness as expected. Denies f/c or drainage.\par \par Interval hx (5/4/21): Pt presents today 3 week s/p excision of right index mucous cyst c/o right index finger burning, redness and swelling. Denies any f/c or drainage. No hand trauma. \par \par Interval hx (5/24/21): Pt presents today 6 weeks postop. Completed PO Doxycycline as prescribed. C/o persistent though improved redness. Denies pain, drainage, or f/c.\par \par Interval hx (6/29/21): Pt presents today 10 weeks postop. Had XR done- no evidence of OM or septic arthritis. C/o limited ROM and occasional discomfort.

## 2022-09-08 NOTE — ASSESSMENT
[FreeTextEntry1] : 71 y/o F with right index finger mucous cyst and left hand Keinbock's disease now 10 weeks s/p excision of mucous cyst. Doing well with early cellulitis, resolved after PO abx \par \par -OT for ROM\par -F/u 1 month\par \par Due to COVID 19, pre-visit patient instructions were explained to the patient and their symptoms were checked upon arrival.  \par Masks were used by the health care providers and staff and the examination room was cleaned after the patient visit was completed.\par \par \par 9/8/2022\par as above\par triggering left index finger\par suggested and injected 5mg kenalog\par tolerated well\par \par f/u in 2 months if not better\par \par Due to COVID 19, pre-visit patient instructions were explained to the patient and their symptoms were checked upon arrival.  \par Masks were used by the health care providers and staff and the examination room was cleaned after the patient visit was completed.\par \par \par

## 2022-09-08 NOTE — HISTORY OF PRESENT ILLNESS
[FreeTextEntry1] : Pt is a 69 y/o F, RHD, with PMH of HTN, HLD, and insomnia who presents for evaluation of right index finger mass x2 months. Denies pain, bleeding, or drainage. No XRs done. Denies trauma.\par \par Also c/o left hand pain. Has been diagnosed with Keinbock's disease. C/o intermittent pain to dorsal hand that radiates up her arm and wakes her up at night. Denies paraesthesias but c/o weakness. Had EMG done several years ago.\par \par nonsmoker, nondiabetic\par Occ:  at [facility]\par \par Interval hx (4/19/21). Patient presents today POD#7 s/p excision of right index mucous cyst. Doing well c/o slight discomfort and swelling. Denies any f/c or bleeding. \par \par Interval hx (4/26/21): Pt presents today POD #14 s/p excision of right index mucous cyst. C/o mild incisional tenderness as expected. Denies f/c or drainage.\par \par Interval hx (5/4/21): Pt presents today 3 week s/p excision of right index mucous cyst c/o right index finger burning, redness and swelling. Denies any f/c or drainage. No hand trauma. \par \par Interval hx (5/24/21): Pt presents today 6 weeks postop. Completed PO Doxycycline as prescribed. C/o persistent though improved redness. Denies pain, drainage, or f/c.\par \par Interval hx (6/29/21): Pt presents today 10 weeks postop. Had XR done- no evidence of OM or septic arthritis. C/o limited ROM and occasional discomfort.

## 2022-09-12 ENCOUNTER — APPOINTMENT (OUTPATIENT)
Age: 72
End: 2022-09-12

## 2022-10-17 ENCOUNTER — APPOINTMENT (OUTPATIENT)
Dept: ORTHOPEDIC SURGERY | Facility: CLINIC | Age: 72
End: 2022-10-17

## 2022-10-17 DIAGNOSIS — J45.909 UNSPECIFIED ASTHMA, UNCOMPLICATED: ICD-10-CM

## 2022-10-17 DIAGNOSIS — M25.552 PAIN IN LEFT HIP: ICD-10-CM

## 2022-10-17 PROCEDURE — 99203 OFFICE O/P NEW LOW 30 MIN: CPT

## 2022-10-17 PROCEDURE — 73502 X-RAY EXAM HIP UNI 2-3 VIEWS: CPT | Mod: LT

## 2022-10-17 NOTE — PHYSICAL EXAM
[de-identified] : General appearance: well nourished and hydrated, pleasant, alert and oriented x 3, cooperative.\par Cardiovascular: no apparent abnormalities, no lower leg edema, no varicosities, pedal pulses are palpable.\par Neurologic: sensation is normal, no muscle weakness in upper or lower extremities\par Dermatologic no apparent skin lesions, moist, warm, no rash.\par Gait: nonantalgic.\par \par Left knee\par Inspection: no effusion or erythema.\par Wounds: none.\par Alignment: normal.\par Palpation: no specific tenderness on palpation.\par ROM: 0-115\par Ligamentous laxity: all ligaments appear stable\par Meniscal Test: negative McMurrays\par Muscle Test: good quad strength.\par \par Right knee\par Inspection: no effusion or erythema.\par Wounds: none.\par Alignment: normal.\par Palpation: no specific tenderness on palpation.\par ROM: 0-115\par Ligamentous laxity: all ligaments appear stable\par Meniscal Test: negative McMurrays.\par Muscle Test: good quad strength.\par \par Left hip\par Inspection: No swelling or ecchymosis.\par Wounds: none.\par Palpation: non-tender.\par Stability: no instability.\par Strength: 5/5 all motor groups.\par ROM: 60 degrees of flexion, 30 degrees ER, 30 degrees abduction\par Leg length: equal.\par \par Right hip\par Inspection: No swelling or ecchymosis.\par Wounds: none.\par Palpation: non-tender.\par Stability: no instability.\par Strength: 5/5 all motor groups.\par ROM: 60 degrees of flexion, 30 degrees ER, 30 degrees abduction\par Leg length: equal. [de-identified] : Imaging done today, AP pelvis and lateral of the hips shows well maintained joint spaces with no evidence of arthritis.

## 2022-10-17 NOTE — ASSESSMENT
[FreeTextEntry1] : 71 year old female with a 6 month history of left lateral hip pain. She has occasional groin pain. Her pain is most severe at night particularly when she sleeps on the left side. She has excellent ROM of both hips but she is tender over the greater trochanter of the left hip. My impression is she has trochanteric bursitis and we will refer her to PT for treatment.

## 2022-11-08 ENCOUNTER — APPOINTMENT (OUTPATIENT)
Dept: PLASTIC SURGERY | Facility: CLINIC | Age: 72
End: 2022-11-08

## 2023-02-12 LAB
ALBUMIN SERPL ELPH-MCNC: 4.1 G/DL
ALP BLD-CCNC: 85 U/L
ALT SERPL-CCNC: 20 U/L
ANION GAP SERPL CALC-SCNC: 11 MMOL/L
AST SERPL-CCNC: 20 U/L
BASOPHILS # BLD AUTO: 0.05 K/UL
BASOPHILS NFR BLD AUTO: 0.8 %
BILIRUB SERPL-MCNC: 0.4 MG/DL
BUN SERPL-MCNC: 14 MG/DL
CALCIUM SERPL-MCNC: 9.5 MG/DL
CHLORIDE SERPL-SCNC: 102 MMOL/L
CHOLEST SERPL-MCNC: 170 MG/DL
CO2 SERPL-SCNC: 28 MMOL/L
CREAT SERPL-MCNC: 0.8 MG/DL
EGFR: 79 ML/MIN/1.73M2
EOSINOPHIL # BLD AUTO: 0.44 K/UL
EOSINOPHIL NFR BLD AUTO: 7.4 %
GLUCOSE SERPL-MCNC: 106 MG/DL
HCT VFR BLD CALC: 43.1 %
HDLC SERPL-MCNC: 43 MG/DL
HGB BLD-MCNC: 14.8 G/DL
IMM GRANULOCYTES NFR BLD AUTO: 0.2 %
LDLC SERPL CALC-MCNC: 105 MG/DL
LYMPHOCYTES # BLD AUTO: 1.76 K/UL
LYMPHOCYTES NFR BLD AUTO: 29.5 %
MAN DIFF?: NORMAL
MCHC RBC-ENTMCNC: 30.1 PG
MCHC RBC-ENTMCNC: 34.3 G/DL
MCV RBC AUTO: 87.6 FL
MONOCYTES # BLD AUTO: 0.56 K/UL
MONOCYTES NFR BLD AUTO: 9.4 %
NEUTROPHILS # BLD AUTO: 3.15 K/UL
NEUTROPHILS NFR BLD AUTO: 52.7 %
NONHDLC SERPL-MCNC: 127 MG/DL
PLATELET # BLD AUTO: 271 K/UL
POTASSIUM SERPL-SCNC: 4.3 MMOL/L
PROT SERPL-MCNC: 6.7 G/DL
RBC # BLD: 4.92 M/UL
RBC # FLD: 13.3 %
SODIUM SERPL-SCNC: 141 MMOL/L
TRIGL SERPL-MCNC: 108 MG/DL
WBC # FLD AUTO: 5.97 K/UL

## 2023-02-14 ENCOUNTER — APPOINTMENT (OUTPATIENT)
Dept: CARDIOLOGY | Facility: CLINIC | Age: 73
End: 2023-02-14
Payer: MEDICARE

## 2023-02-14 VITALS
SYSTOLIC BLOOD PRESSURE: 122 MMHG | HEART RATE: 69 BPM | WEIGHT: 182 LBS | HEIGHT: 62 IN | BODY MASS INDEX: 33.49 KG/M2 | DIASTOLIC BLOOD PRESSURE: 74 MMHG | TEMPERATURE: 96.7 F

## 2023-02-14 DIAGNOSIS — F41.9 ANXIETY DISORDER, UNSPECIFIED: ICD-10-CM

## 2023-02-14 PROCEDURE — 99214 OFFICE O/P EST MOD 30 MIN: CPT | Mod: 25

## 2023-02-14 PROCEDURE — 93000 ELECTROCARDIOGRAM COMPLETE: CPT

## 2023-02-14 RX ORDER — ALBUTEROL SULFATE 90 UG/1
108 (90 BASE) INHALANT RESPIRATORY (INHALATION)
Qty: 18 | Refills: 0 | Status: DISCONTINUED | COMMUNITY
Start: 2022-03-22 | End: 2023-02-14

## 2023-02-14 RX ORDER — FAMOTIDINE 40 MG/1
40 TABLET, FILM COATED ORAL TWICE DAILY
Refills: 0 | Status: DISCONTINUED | COMMUNITY
End: 2023-02-14

## 2023-02-14 NOTE — HISTORY OF PRESENT ILLNESS
[FreeTextEntry1] : The patient had covid in Jauary . Since that time she has had periods of palpitations which usually occur at rest not on exertin  sHe had a 2 day monitor placed by Dr. gore .

## 2023-02-14 NOTE — ASSESSMENT
[FreeTextEntry1] : The patient has been under increased stress . She has a  who has  Lowey body dementia and her sister is now living withher and she is raising her 17 year old nephew . . She has had palpitations which usually occur at rest and is usually resoolve within a couple of minutes .

## 2023-02-14 NOTE — CARDIOLOGY SUMMARY
[___] : [unfilled] [de-identified] : 9- NSR Normal ECG . \par 2-9-2022  NSR LVH NS  twave change\par 4- NSR NS  twave change  LVH \par 8- NSR Normal ECG \par 2- NSR LVH NS  Twav ehkendra e.  [de-identified] : 4- EPATCH No arrhythmias .  [de-identified] : 11- ETT Echo completed 3 minutes No ischemia . trace MR and TR  [de-identified] : 4- Normal Lv systolic functin Mild MR trace TR

## 2023-04-13 ENCOUNTER — EMERGENCY (EMERGENCY)
Facility: HOSPITAL | Age: 73
LOS: 0 days | Discharge: HOME | End: 2023-04-13
Attending: EMERGENCY MEDICINE
Payer: MEDICARE

## 2023-04-13 VITALS
OXYGEN SATURATION: 93 % | RESPIRATION RATE: 20 BRPM | HEART RATE: 74 BPM | WEIGHT: 160.06 LBS | TEMPERATURE: 97 F | DIASTOLIC BLOOD PRESSURE: 56 MMHG | SYSTOLIC BLOOD PRESSURE: 120 MMHG

## 2023-04-13 VITALS — HEIGHT: 66 IN

## 2023-04-13 DIAGNOSIS — Z96.659 PRESENCE OF UNSPECIFIED ARTIFICIAL KNEE JOINT: Chronic | ICD-10-CM

## 2023-04-13 DIAGNOSIS — Z96.651 PRESENCE OF RIGHT ARTIFICIAL KNEE JOINT: Chronic | ICD-10-CM

## 2023-04-13 LAB
ALBUMIN SERPL ELPH-MCNC: 3.7 G/DL — SIGNIFICANT CHANGE UP (ref 3.5–5.2)
ALP SERPL-CCNC: 77 U/L — SIGNIFICANT CHANGE UP (ref 30–115)
ALT FLD-CCNC: 18 U/L — SIGNIFICANT CHANGE UP (ref 0–41)
ANION GAP SERPL CALC-SCNC: 11 MMOL/L — SIGNIFICANT CHANGE UP (ref 7–14)
AST SERPL-CCNC: 20 U/L — SIGNIFICANT CHANGE UP (ref 0–41)
BASOPHILS # BLD AUTO: 0.05 K/UL — SIGNIFICANT CHANGE UP (ref 0–0.2)
BASOPHILS NFR BLD AUTO: 0.3 % — SIGNIFICANT CHANGE UP (ref 0–1)
BILIRUB SERPL-MCNC: 0.5 MG/DL — SIGNIFICANT CHANGE UP (ref 0.2–1.2)
BUN SERPL-MCNC: 14 MG/DL — SIGNIFICANT CHANGE UP (ref 10–20)
CALCIUM SERPL-MCNC: 9.1 MG/DL — SIGNIFICANT CHANGE UP (ref 8.4–10.4)
CHLORIDE SERPL-SCNC: 100 MMOL/L — SIGNIFICANT CHANGE UP (ref 98–110)
CO2 SERPL-SCNC: 26 MMOL/L — SIGNIFICANT CHANGE UP (ref 17–32)
CREAT SERPL-MCNC: 0.7 MG/DL — SIGNIFICANT CHANGE UP (ref 0.7–1.5)
EGFR: 92 ML/MIN/1.73M2 — SIGNIFICANT CHANGE UP
EOSINOPHIL # BLD AUTO: 0.15 K/UL — SIGNIFICANT CHANGE UP (ref 0–0.7)
EOSINOPHIL NFR BLD AUTO: 1 % — SIGNIFICANT CHANGE UP (ref 0–8)
GLUCOSE SERPL-MCNC: 112 MG/DL — HIGH (ref 70–99)
HCT VFR BLD CALC: 41.6 % — SIGNIFICANT CHANGE UP (ref 37–47)
HGB BLD-MCNC: 13.7 G/DL — SIGNIFICANT CHANGE UP (ref 12–16)
IMM GRANULOCYTES NFR BLD AUTO: 0.5 % — HIGH (ref 0.1–0.3)
LYMPHOCYTES # BLD AUTO: 1.02 K/UL — LOW (ref 1.2–3.4)
LYMPHOCYTES # BLD AUTO: 6.8 % — LOW (ref 20.5–51.1)
MCHC RBC-ENTMCNC: 29.5 PG — SIGNIFICANT CHANGE UP (ref 27–31)
MCHC RBC-ENTMCNC: 32.9 G/DL — SIGNIFICANT CHANGE UP (ref 32–37)
MCV RBC AUTO: 89.5 FL — SIGNIFICANT CHANGE UP (ref 81–99)
MONOCYTES # BLD AUTO: 0.92 K/UL — HIGH (ref 0.1–0.6)
MONOCYTES NFR BLD AUTO: 6.1 % — SIGNIFICANT CHANGE UP (ref 1.7–9.3)
NEUTROPHILS # BLD AUTO: 12.79 K/UL — HIGH (ref 1.4–6.5)
NEUTROPHILS NFR BLD AUTO: 85.3 % — HIGH (ref 42.2–75.2)
NRBC # BLD: 0 /100 WBCS — SIGNIFICANT CHANGE UP (ref 0–0)
PLATELET # BLD AUTO: 263 K/UL — SIGNIFICANT CHANGE UP (ref 130–400)
PMV BLD: 10.1 FL — SIGNIFICANT CHANGE UP (ref 7.4–10.4)
POTASSIUM SERPL-MCNC: 3.7 MMOL/L — SIGNIFICANT CHANGE UP (ref 3.5–5)
POTASSIUM SERPL-SCNC: 3.7 MMOL/L — SIGNIFICANT CHANGE UP (ref 3.5–5)
PROT SERPL-MCNC: 6.2 G/DL — SIGNIFICANT CHANGE UP (ref 6–8)
RBC # BLD: 4.65 M/UL — SIGNIFICANT CHANGE UP (ref 4.2–5.4)
RBC # FLD: 13.2 % — SIGNIFICANT CHANGE UP (ref 11.5–14.5)
SODIUM SERPL-SCNC: 137 MMOL/L — SIGNIFICANT CHANGE UP (ref 135–146)
WBC # BLD: 15.01 K/UL — HIGH (ref 4.8–10.8)
WBC # FLD AUTO: 15.01 K/UL — HIGH (ref 4.8–10.8)

## 2023-04-13 PROCEDURE — 73060 X-RAY EXAM OF HUMERUS: CPT | Mod: 26,RT

## 2023-04-13 PROCEDURE — 73590 X-RAY EXAM OF LOWER LEG: CPT | Mod: RT

## 2023-04-13 PROCEDURE — 72170 X-RAY EXAM OF PELVIS: CPT | Mod: 26

## 2023-04-13 PROCEDURE — 73630 X-RAY EXAM OF FOOT: CPT | Mod: RT

## 2023-04-13 PROCEDURE — 73590 X-RAY EXAM OF LOWER LEG: CPT | Mod: 26,RT

## 2023-04-13 PROCEDURE — G1004: CPT

## 2023-04-13 PROCEDURE — 73060 X-RAY EXAM OF HUMERUS: CPT | Mod: RT

## 2023-04-13 PROCEDURE — 73610 X-RAY EXAM OF ANKLE: CPT | Mod: RT

## 2023-04-13 PROCEDURE — 23650 CLTX SHO DSLC W/MNPJ WO ANES: CPT | Mod: RT

## 2023-04-13 PROCEDURE — 73610 X-RAY EXAM OF ANKLE: CPT | Mod: 26,RT

## 2023-04-13 PROCEDURE — 96375 TX/PRO/DX INJ NEW DRUG ADDON: CPT | Mod: XU

## 2023-04-13 PROCEDURE — 93010 ELECTROCARDIOGRAM REPORT: CPT

## 2023-04-13 PROCEDURE — 70450 CT HEAD/BRAIN W/O DYE: CPT | Mod: MG

## 2023-04-13 PROCEDURE — 73030 X-RAY EXAM OF SHOULDER: CPT | Mod: 26,RT

## 2023-04-13 PROCEDURE — 23650 CLTX SHO DSLC W/MNPJ WO ANES: CPT | Mod: 54,RT

## 2023-04-13 PROCEDURE — 73630 X-RAY EXAM OF FOOT: CPT | Mod: 26,RT

## 2023-04-13 PROCEDURE — 99285 EMERGENCY DEPT VISIT HI MDM: CPT | Mod: 57

## 2023-04-13 PROCEDURE — 72170 X-RAY EXAM OF PELVIS: CPT

## 2023-04-13 PROCEDURE — 99285 EMERGENCY DEPT VISIT HI MDM: CPT | Mod: 25

## 2023-04-13 PROCEDURE — 80053 COMPREHEN METABOLIC PANEL: CPT

## 2023-04-13 PROCEDURE — 71045 X-RAY EXAM CHEST 1 VIEW: CPT

## 2023-04-13 PROCEDURE — 73030 X-RAY EXAM OF SHOULDER: CPT | Mod: RT

## 2023-04-13 PROCEDURE — 96374 THER/PROPH/DIAG INJ IV PUSH: CPT | Mod: XU

## 2023-04-13 PROCEDURE — 71045 X-RAY EXAM CHEST 1 VIEW: CPT | Mod: 26

## 2023-04-13 PROCEDURE — 70450 CT HEAD/BRAIN W/O DYE: CPT | Mod: 26,MG

## 2023-04-13 PROCEDURE — 36415 COLL VENOUS BLD VENIPUNCTURE: CPT

## 2023-04-13 PROCEDURE — 85025 COMPLETE CBC W/AUTO DIFF WBC: CPT

## 2023-04-13 PROCEDURE — 93005 ELECTROCARDIOGRAM TRACING: CPT

## 2023-04-13 RX ORDER — SODIUM CHLORIDE 9 MG/ML
1000 INJECTION, SOLUTION INTRAVENOUS ONCE
Refills: 0 | Status: COMPLETED | OUTPATIENT
Start: 2023-04-13 | End: 2023-04-13

## 2023-04-13 RX ORDER — KETOROLAC TROMETHAMINE 30 MG/ML
15 SYRINGE (ML) INJECTION ONCE
Refills: 0 | Status: DISCONTINUED | OUTPATIENT
Start: 2023-04-13 | End: 2023-04-13

## 2023-04-13 RX ORDER — ONDANSETRON 8 MG/1
4 TABLET, FILM COATED ORAL ONCE
Refills: 0 | Status: COMPLETED | OUTPATIENT
Start: 2023-04-13 | End: 2023-04-13

## 2023-04-13 RX ORDER — MORPHINE SULFATE 50 MG/1
4 CAPSULE, EXTENDED RELEASE ORAL ONCE
Refills: 0 | Status: DISCONTINUED | OUTPATIENT
Start: 2023-04-13 | End: 2023-04-13

## 2023-04-13 RX ADMIN — MORPHINE SULFATE 4 MILLIGRAM(S): 50 CAPSULE, EXTENDED RELEASE ORAL at 14:00

## 2023-04-13 RX ADMIN — Medication 15 MILLIGRAM(S): at 15:15

## 2023-04-13 RX ADMIN — Medication 15 MILLIGRAM(S): at 15:00

## 2023-04-13 RX ADMIN — MORPHINE SULFATE 4 MILLIGRAM(S): 50 CAPSULE, EXTENDED RELEASE ORAL at 13:43

## 2023-04-13 RX ADMIN — ONDANSETRON 4 MILLIGRAM(S): 8 TABLET, FILM COATED ORAL at 15:00

## 2023-04-13 RX ADMIN — SODIUM CHLORIDE 1000 MILLILITER(S): 9 INJECTION, SOLUTION INTRAVENOUS at 15:01

## 2023-04-13 NOTE — ED PROVIDER NOTE - CLINICAL SUMMARY MEDICAL DECISION MAKING FREE TEXT BOX
72-year-old female, past medical history hypertension, high cholesterol, comes in s/p fall.  Patient states she was walking downstairs with a box of clothing when she lost her balance and fell forward landing on her right side.  Now complaining of right shoulder pain and right foot pain.  Unable to ambulate after the fall due to foot pain.  On exam, pt in NAD, AAOx3, head NC/AT, CN II-XII intact, neck (-) midline tenderness, lungs CTA B/L, CV S1S2 regular, abdomen soft/NT/ND/(+)BS, ext (+) TTP/deformity to right shoulder, unable to move shoulder, FROM of right elbow/wrist, (+) TTP to right foot, (-) deformity, FROM of toes, pulses intact, (+) abrasion to right tib-fib. Labs, CT, XR done and reviewed. Shoulder reduced. Will d/c with ortho follow up. Family at bedside, will take pt home.

## 2023-04-13 NOTE — ED PROVIDER NOTE - CARE PLAN
"Pt shakes head "yes" for consent to receive medical record from [facility]. PARMINDER Trevino and PARMINDER Wong witnessed at bedside.   " 1 Principal Discharge DX:	Dislocation of right shoulder joint  Secondary Diagnosis:	Fall   Principal Discharge DX:	Dislocation of right shoulder joint  Secondary Diagnosis:	Fall  Secondary Diagnosis:	Right foot pain

## 2023-04-13 NOTE — ED PROVIDER NOTE - PHYSICAL EXAMINATION
CONST: Well appearing in NAD  Head: NCAT  EYES: PERRL, EOMI, Sclera and conjunctiva clear.   ENT: Oropharynx normal appearing, no erythema or exudates. Uvula midline.  NECK: Non-tender, no meningeal signs  CARD: Normal S1 S2; Normal rate and rhythm  RESP: Equal BS B/L, No wheezes, rhonchi or rales. No distress  GI: Soft, non-tender, non-distended.  MS: R shoulder tenderness over joint w/ obvious deformity > likely dislocated  Vas: 2+ pulses distally  SKIN: Warm, dry, no acute rashes  NEURO: A&Ox3, No focal deficits.

## 2023-04-13 NOTE — ED PROVIDER NOTE - NS ED ATTENDING STATEMENT MOD
This was a shared visit with the PETERSON. I reviewed and verified the documentation and independently performed the documented:

## 2023-04-13 NOTE — ED PROVIDER NOTE - PATIENT PORTAL LINK FT
You can access the FollowMyHealth Patient Portal offered by [facility] by registering at the following website: [email or web address]. By joining [facility]’s FollowMyHealth portal, you will also be able to view your health information using other applications (apps) compatible with our system.

## 2023-04-13 NOTE — ED ADULT NURSE NOTE - DRUG PRE-SCREENING (DAST -1)
1556 Patient arrived via wheelchair from ED. Patient complained of feeling hot, lightheaded, and nauseous. Patient reports passing out in the arms of a coworker. EMS checked patient out, and patient declined coming into the hospital. She went home and laid down but states she still feels light headed. She states she feels like she might be dehydrated as she has drank orange juice and ate some ice. Patient reports feeling baby moving. Denies ROM or vaginal bleeding. 1620 Patient provided water and crackers. 1710 Patient discharged home with discharge instructions and belongings.
Statement Selected

## 2023-04-13 NOTE — ED PROVIDER NOTE - NSICDXPASTMEDICALHX_GEN_ALL_CORE_FT
Reason for Call:  Medication or medication refill:    Do you use a [facility]?  Name of the pharmacy and phone number for the current request:  [facility] - [phone]    Name of the medication requested: Indomethacin and Hydrocodone    Other request: He is having a flare up of his gout.  He is missing work.  He would like this filled today.  Please advise.    Can we leave a detailed message on this number? YES    Phone number patient can be reached at: Home number on file [phone] (home)    Best Time: any    Call taken on 8/28/2017 at 11:00 AM by Shivani Kevin       PAST MEDICAL HISTORY:  Digital mucous cyst of finger right index finger    High cholesterol     HTN (hypertension)     Sleeping difficulty

## 2023-04-13 NOTE — ED PROVIDER NOTE - ATTENDING APP SHARED VISIT CONTRIBUTION OF CARE
72-year-old female, past medical history hypertension, high cholesterol, comes in s/p fall.  Patient states she was walking downstairs with a box of clothing when she lost her balance and fell forward landing on her right side.  Now complaining of right shoulder pain and right foot pain.  Unable to ambulate after the fall due to foot pain.  On exam, pt in NAD, AAOx3, head NC/AT, CN II-XII intact, neck (-) midline tenderness, lungs CTA B/L, CV S1S2 regular, abdomen soft/NT/ND/(+)BS, ext (+) TTP/deformity to right shoulder, unable to move shoulder, FROM of right elbow/wrist, (+) TTP to right foot, (-) deformity, FROM of toes, pulses intact, (+) abrasion to right tib-fib. Will do labs, CT, and reevaluate.

## 2023-04-13 NOTE — ED PROCEDURE NOTE - NS ED ATTENDING STATEMENT MOD
This was a shared visit with the PETERSON. I reviewed and verified the documentation and independently performed the documented:
This was a shared visit with the PETESRON. I reviewed and verified the documentation and independently performed the documented:

## 2023-04-13 NOTE — ED PROVIDER NOTE - NSFOLLOWUPINSTRUCTIONS_ED_ALL_ED_FT
Our Emergency Department Referral Coordinators will be reaching out to you in the next 24-48 hours from 9:00am to 5:00pm with a follow up appointment. Please expect a phone call from the hospital in that time frame. If you do not receive a call or if you have any questions or concerns, you can reach them at   [phone]    Shoulder Dislocation  Your shoulder joint is made up of 3 bones:  The upper arm bone (humerus).The shoulder blade (scapula).The collarbone (clavicle).A shoulder dislocation happens when your upper arm bone moves out of its normal place in your shoulder joint.  What are the causes?  This condition is often caused by:  A fall.A hard, direct hit to the shoulder.A forceful movement of the shoulder.What increases the risk?  You are more likely to develop this condition if you play sports.  What are the signs or symptoms?     Bad shape (deformity) of the shoulder.Very bad pain.A shoulder that you cannot move.Numbness, weakness, or tingling in your neck or down your arm.Bruising or swelling around your shoulder.How is this treated?  This condition is treated with a procedure called a reduction. This is done to place the upper arm bone back in the joint. There are two types of reduction:  Closed reduction. The upper arm bone is placed back in the joint without surgery. The doctor uses his or her hands to guide the bone back into place.Open reduction. Surgery is done to place the upper arm bone back in the joint. This may be needed if:  You have a weak shoulder joint or weak tissues that connect bones to each other (ligaments).You have had more than one shoulder dislocation.The nerves or blood vessels around your shoulder have been damaged.After the procedure, you will wear a brace or sling to prevent the arm from moving.  After the brace or sling is removed, you will have physical therapy to help improve movement (range of motion) in your shoulder joint.  Follow these instructions at home:  Medicines     Take over-the-counter and prescription medicines only as told by your doctor.Ask your doctor if the medicine prescribed to you:   Requires you to avoid driving or using heavy machinery. Can cause trouble pooping (constipation). You may need to take steps to prevent or treat trouble pooping:  Drink enough fluid to keep your pee (urine) pale yellow.Take over-the-counter or prescription medicines.Eat foods that are high in fiber. These include beans, whole grains, and fresh fruits and vegetables. Limit foods that are high in fat and sugar. These include fried or sweet foods.If you have a brace or sling:     Wear the brace or sling as told by your doctor. Remove it only as told by your doctor.Loosen the brace or sling if your fingers:  Tingle.Become numb.Turn cold or blue.Keep the brace or sling clean.If the brace or sling is not waterproof:  Do not let it get wet.Cover it with a watertight covering when you take a bath or shower.Managing pain, stiffness, and swelling        If told, put ice on the injured area.  If you can remove your brace or sling, remove it as told by your doctor.Put ice in a plastic bag.Place a towel between your skin and the bag.Leave the ice on for 20 minutes, 2–3 times per day.Move your fingers often.Raise (elevate) the injured area above the level of your heart while you are sitting or lying down.Activity     Do not lift your arm above shoulder level until your doctor approves.Do not lift anything until your doctor says that it is safe.Do not push or pull things until your doctor approves.Return to your normal activities as told by your doctor. Ask your doctor what activities are safe for you.Do range-of-motion exercises only as told by your doctor.Exercise your hand by squeezing a soft ball. This keeps your hand and wrist from getting stiff and swollen.General instructions     Do not drive while you are wearing a brace or sling on a hand that you use for driving.Do not take baths, swim, or use a hot tub until your doctor approves. Ask your doctor if you may take showers. You may only be allowed to take sponge baths.Do not use any tobacco products, including cigarettes, chewing tobacco, or e-cigarettes. These can delay healing. If you need help quitting, ask your doctor.Keep all follow-up visits as told by your doctor. This is important.Contact a doctor if:  Your brace or sling gets damaged.Get help right away if:  Your pain gets worse, not better.You lose feeling in your arm or hand.Your arm or hand turns white and cold.Summary  A shoulder dislocation happens when your upper arm bone moves out of its normal place in your shoulder joint.It is often caused by a fall, a strong hit to the shoulder, or a forceful movement of the shoulder.It causes very bad pain. You may not be able to move your shoulder.This condition is treated with either closed or open reduction. You will also be given a brace or sling. You will do exercises to improve movement in your shoulder joint.Contact a doctor if your brace or sling gets damaged. Get help right away if your pain gets worse, you lose feeling in your arm or hand, or your arm or hand turns white or cold.This information is not intended to replace advice given to you by your health care provider. Make sure you discuss any questions you have with your health care provider.

## 2023-04-13 NOTE — ED PROVIDER NOTE - OBJECTIVE STATEMENT
71 yo f w/ a PMHx of HTN, HLD, presents to the for evaluation s/p fall. Patient was in her attic, had mechanical trip and fall down the stairs landing on her right side. Denies LOC or AC use. Complaining of R shoulder pain and R LE pain, worse w/ AROM. Denies CP, SOB, weakness, numbness, tingling, fever chills.

## 2023-04-14 ENCOUNTER — APPOINTMENT (OUTPATIENT)
Dept: ORTHOPEDIC SURGERY | Facility: CLINIC | Age: 73
End: 2023-04-14
Payer: MEDICARE

## 2023-04-14 PROCEDURE — 99203 OFFICE O/P NEW LOW 30 MIN: CPT

## 2023-04-15 NOTE — HISTORY OF PRESENT ILLNESS
[de-identified] : Patient is here for evaluation of right shoulder pain\par sustained right shoulder dislocation, first time, reduced in ed, in sling\par \par NAD\par Right shoulder:\par TTP ant GH joint, bicipital groove\par perez rom \par nvi\par comp soft and nt\par \par XRay right shoulder negative for fracture, dislocation, arthritis, reduced from ED and [facility]\par \par Plan\par went over findings\par explained the dislocation\par will start pt in 2 weeks\par mri right shoulder\par fu in 4-6 weeks

## 2023-04-26 ENCOUNTER — OUTPATIENT (OUTPATIENT)
Dept: OUTPATIENT SERVICES | Facility: HOSPITAL | Age: 73
LOS: 1 days | End: 2023-04-26
Payer: MEDICARE

## 2023-04-26 ENCOUNTER — RESULT REVIEW (OUTPATIENT)
Age: 73
End: 2023-04-26

## 2023-04-26 DIAGNOSIS — Z96.651 PRESENCE OF RIGHT ARTIFICIAL KNEE JOINT: Chronic | ICD-10-CM

## 2023-04-26 DIAGNOSIS — M25.511 PAIN IN RIGHT SHOULDER: ICD-10-CM

## 2023-04-26 DIAGNOSIS — Z96.659 PRESENCE OF UNSPECIFIED ARTIFICIAL KNEE JOINT: Chronic | ICD-10-CM

## 2023-04-26 DIAGNOSIS — Z00.8 ENCOUNTER FOR OTHER GENERAL EXAMINATION: ICD-10-CM

## 2023-04-26 PROCEDURE — 73221 MRI JOINT UPR EXTREM W/O DYE: CPT | Mod: RT

## 2023-04-26 PROCEDURE — 73221 MRI JOINT UPR EXTREM W/O DYE: CPT | Mod: 26,RT

## 2023-04-27 DIAGNOSIS — M25.511 PAIN IN RIGHT SHOULDER: ICD-10-CM

## 2023-05-05 ENCOUNTER — LABORATORY RESULT (OUTPATIENT)
Age: 73
End: 2023-05-05

## 2023-05-05 ENCOUNTER — RESULT REVIEW (OUTPATIENT)
Age: 73
End: 2023-05-05

## 2023-05-05 ENCOUNTER — OUTPATIENT (OUTPATIENT)
Dept: OUTPATIENT SERVICES | Facility: HOSPITAL | Age: 73
LOS: 1 days | End: 2023-05-05
Payer: MEDICARE

## 2023-05-05 DIAGNOSIS — R91.8 OTHER NONSPECIFIC ABNORMAL FINDING OF LUNG FIELD: ICD-10-CM

## 2023-05-05 DIAGNOSIS — Z96.659 PRESENCE OF UNSPECIFIED ARTIFICIAL KNEE JOINT: Chronic | ICD-10-CM

## 2023-05-05 DIAGNOSIS — Z96.651 PRESENCE OF RIGHT ARTIFICIAL KNEE JOINT: Chronic | ICD-10-CM

## 2023-05-05 PROCEDURE — 71046 X-RAY EXAM CHEST 2 VIEWS: CPT

## 2023-05-05 PROCEDURE — 71046 X-RAY EXAM CHEST 2 VIEWS: CPT | Mod: 26

## 2023-05-06 DIAGNOSIS — R91.8 OTHER NONSPECIFIC ABNORMAL FINDING OF LUNG FIELD: ICD-10-CM

## 2023-05-10 ENCOUNTER — APPOINTMENT (OUTPATIENT)
Dept: ORTHOPEDIC SURGERY | Facility: CLINIC | Age: 73
End: 2023-05-10
Payer: MEDICARE

## 2023-05-10 PROCEDURE — 99213 OFFICE O/P EST LOW 20 MIN: CPT

## 2023-05-10 NOTE — ASSESSMENT
[FreeTextEntry1] :  recommended a sleeve or Ace wrap warm soaks meloxicam is fine will have to stop before her surgery after the surgery on the shoulder she could do little therapy crutches in the correct opposite hand no reason for an MRI return p.r.n.

## 2023-05-10 NOTE — HISTORY OF PRESENT ILLNESS
[de-identified] : 72-year-old woman fell down some attic stairs about a month ago 4/13/23The sustaining a  dislocated right shoulder with large rotator cuff tear planning surgery and the month by Dr. Moreno having some pain swelling in the ankle x-rays reported to be negative does have a crutch the

## 2023-05-10 NOTE — IMAGING
[de-identified] :  pleasant he is examined in no distress sling on the right arm shoulder clinically located no focal neurologic deficits\par \par Right ankle foot slight antalgic gait some healing abrasions on the shin mild swelling diffusely tender some calf the pain distally Achilles tendon is intact negative anterior drawer\par \par X-rays right foot and ankle  the were unremarkable no fractures

## 2023-05-12 ENCOUNTER — APPOINTMENT (OUTPATIENT)
Dept: ORTHOPEDIC SURGERY | Facility: CLINIC | Age: 73
End: 2023-05-12

## 2023-05-31 ENCOUNTER — APPOINTMENT (OUTPATIENT)
Dept: ORTHOPEDIC SURGERY | Facility: AMBULATORY SURGERY CENTER | Age: 73
End: 2023-05-31
Payer: MEDICARE

## 2023-05-31 PROCEDURE — 29826 SHO ARTHRS SRG DECOMPRESSION: CPT | Mod: RT

## 2023-05-31 PROCEDURE — 29828 SHO ARTHRS SRG BICP TENODSIS: CPT | Mod: RT

## 2023-05-31 PROCEDURE — 29827 SHO ARTHRS SRG RT8TR CUF RPR: CPT | Mod: RT

## 2023-06-08 ENCOUNTER — APPOINTMENT (OUTPATIENT)
Dept: ORTHOPEDIC SURGERY | Facility: CLINIC | Age: 73
End: 2023-06-08
Payer: MEDICARE

## 2023-06-08 PROCEDURE — 99024 POSTOP FOLLOW-UP VISIT: CPT

## 2023-06-08 NOTE — HISTORY OF PRESENT ILLNESS
[de-identified] : Pt is s/p right shoulder arthroscopy\par Doing well.\par Pain controlled\par \par NAD\par Right shoulder\par Incisions healed\par Mild diffuse TTP\par Compartments soft and NT\par ROM limited secondary to pain\par NVI\par \par s/p right shoulder arthroscopy\par went over the surgery in detail\par will start pt, protocol provided\par continue pain control as needed\par fu in 1 month\par all questions answered\par

## 2023-06-16 ENCOUNTER — APPOINTMENT (OUTPATIENT)
Dept: CARDIOLOGY | Facility: CLINIC | Age: 73
End: 2023-06-16

## 2023-06-20 ENCOUNTER — RX RENEWAL (OUTPATIENT)
Age: 73
End: 2023-06-20

## 2023-06-22 ENCOUNTER — NON-APPOINTMENT (OUTPATIENT)
Age: 73
End: 2023-06-22

## 2023-07-11 ENCOUNTER — APPOINTMENT (OUTPATIENT)
Dept: CARDIOLOGY | Facility: CLINIC | Age: 73
End: 2023-07-11
Payer: MEDICARE

## 2023-07-11 VITALS
SYSTOLIC BLOOD PRESSURE: 126 MMHG | HEART RATE: 70 BPM | BODY MASS INDEX: 32.57 KG/M2 | WEIGHT: 177 LBS | HEIGHT: 62 IN | DIASTOLIC BLOOD PRESSURE: 70 MMHG

## 2023-07-11 DIAGNOSIS — M85.80 OTHER SPECIFIED DISORDERS OF BONE DENSITY AND STRUCTURE, UNSPECIFIED SITE: ICD-10-CM

## 2023-07-11 PROCEDURE — 93000 ELECTROCARDIOGRAM COMPLETE: CPT

## 2023-07-11 PROCEDURE — 99214 OFFICE O/P EST MOD 30 MIN: CPT | Mod: 25

## 2023-07-11 NOTE — HISTORY OF PRESENT ILLNESS
[FreeTextEntry1] : The patient has fallen  had dislocated her left shoulder . Need arthroscopic surgery . The patient has not had chest pain . Has palpitations after drinking soda and eating chocolate .

## 2023-07-11 NOTE — CARDIOLOGY SUMMARY
[___] : [unfilled] [de-identified] : 9- NSR Normal ECG . \par 2-9-2022  NSR LVH NS  twave change\par 4- NSR NS  twave change  LVH \par 8- NSR Normal ECG \par [id number] LVH \par 2- NSR LVH NS  Twav ehcnag e.  [de-identified] : 4- EPATCH No arrhythmias . \par 2-2023 EPATCH no signficant arrhythmais .  [de-identified] : 11- ETT Echo completed 3 minutes No ischemia . trace MR and TR  [de-identified] : 4- Normal Lv systolic functin Mild MR trace TR

## 2023-07-11 NOTE — ASSESSMENT
[FreeTextEntry1] : The patient has been under increased stress . She has a  who has  Lowey body dementia. She has not had chest pain . She had a mechanical fall and fratured her right shoulder .Neede arthroscopic surgery . She is unable to lift her arms . Palpitaions have improved since last visit after stopping chocolate and caffeine .

## 2023-07-17 ENCOUNTER — APPOINTMENT (OUTPATIENT)
Dept: ORTHOPEDIC SURGERY | Facility: CLINIC | Age: 73
End: 2023-07-17
Payer: MEDICARE

## 2023-07-17 VITALS — HEIGHT: 62 IN | BODY MASS INDEX: 32.57 KG/M2 | WEIGHT: 177 LBS

## 2023-07-17 DIAGNOSIS — S93.491A SPRAIN OF OTHER LIGAMENT OF RIGHT ANKLE, INITIAL ENCOUNTER: ICD-10-CM

## 2023-07-17 PROCEDURE — 99213 OFFICE O/P EST LOW 20 MIN: CPT

## 2023-07-17 NOTE — HISTORY OF PRESENT ILLNESS
[de-identified] : 72-year-old patient comes in today for follow-up of her right ankle.  She recently had right shoulder surgery and is now endorsing some mild persistent pain involving her right ankle following a sprain back in May.  Denies any instability.

## 2023-07-17 NOTE — PHYSICAL EXAM
[de-identified] : She is alert oriented x3.  She is pleasant cooperative with exam.  I examined her right lower extremity.  She is somewhat tender over the lateral ankle ligament complex.  Her ankle is stable however.  She demonstrates full range of motion of the ankle hindfoot midfoot forefoot.  She is neurovascular intact distally.

## 2023-07-17 NOTE — DISCUSSION/SUMMARY
[de-identified] : I discussed the patient's findings with her.  I do think she is healing appropriately.  At this time I would have her start a home exercise program.  Anti-inflammatory medications as necessary.  I will see her back as needed.  All questions answered.

## 2023-07-20 ENCOUNTER — APPOINTMENT (OUTPATIENT)
Dept: ORTHOPEDIC SURGERY | Facility: CLINIC | Age: 73
End: 2023-07-20
Payer: MEDICARE

## 2023-07-20 PROCEDURE — 99024 POSTOP FOLLOW-UP VISIT: CPT

## 2023-07-20 NOTE — HISTORY OF PRESENT ILLNESS
[de-identified] : Patient is a 72-year-old female who reports to the office for her second postop visit status post right shoulder arthroscopy/rotator cuff repair done on 5/21/2023 by Dr. Moreno.  Is been doing formal physical therapy which has been giving her some relief.  She states that her shoulder still feels stiff and has limited range of motion.  Denies any numbness or tingling.

## 2023-07-20 NOTE — PHYSICAL EXAM
[Right] : right shoulder [Sitting] : sitting [4 ___] : forward flexion 4[unfilled]/5 [4___] : internal rotation 4[unfilled]/5 [] : motor and sensory intact distally [TWNoteComboBox7] : active forward flexion 60 degrees [TWNoteComboBox4] : passive forward flexion 150 degrees [de-identified] : active abduction 60 degrees [TWNoteComboBox9] : passive abduction 150 degrees [TWNoteComboBox6] : internal rotation L4 [de-identified] : external rotation 80 degrees

## 2023-07-20 NOTE — DISCUSSION/SUMMARY
[de-identified] : At this point, the patient's shoulder is stiff and her ROM and strength need to improve.  She will continue formal physical therapy and a new prescription was provided for her.  She will take meloxicam as needed for pain.  \par \par The patient will follow-up in 3 months for further evaluation.  All of the patient's questions/concerns were answered in detail.\par \par

## 2023-08-14 ENCOUNTER — APPOINTMENT (OUTPATIENT)
Dept: OBGYN | Facility: CLINIC | Age: 73
End: 2023-08-14

## 2023-08-22 ENCOUNTER — OUTPATIENT (OUTPATIENT)
Dept: OUTPATIENT SERVICES | Facility: HOSPITAL | Age: 73
LOS: 1 days | End: 2023-08-22
Payer: MEDICARE

## 2023-08-22 ENCOUNTER — RESULT REVIEW (OUTPATIENT)
Age: 73
End: 2023-08-22

## 2023-08-22 DIAGNOSIS — Z96.651 PRESENCE OF RIGHT ARTIFICIAL KNEE JOINT: Chronic | ICD-10-CM

## 2023-08-22 DIAGNOSIS — Z00.8 ENCOUNTER FOR OTHER GENERAL EXAMINATION: ICD-10-CM

## 2023-08-22 DIAGNOSIS — M25.571 PAIN IN RIGHT ANKLE AND JOINTS OF RIGHT FOOT: ICD-10-CM

## 2023-08-22 DIAGNOSIS — Z96.659 PRESENCE OF UNSPECIFIED ARTIFICIAL KNEE JOINT: Chronic | ICD-10-CM

## 2023-08-22 PROCEDURE — 73721 MRI JNT OF LWR EXTRE W/O DYE: CPT | Mod: 26,RT

## 2023-08-22 PROCEDURE — 73721 MRI JNT OF LWR EXTRE W/O DYE: CPT | Mod: RT

## 2023-08-23 ENCOUNTER — OUTPATIENT (OUTPATIENT)
Dept: OUTPATIENT SERVICES | Facility: HOSPITAL | Age: 73
LOS: 1 days | End: 2023-08-23
Payer: MEDICARE

## 2023-08-23 DIAGNOSIS — Z96.651 PRESENCE OF RIGHT ARTIFICIAL KNEE JOINT: Chronic | ICD-10-CM

## 2023-08-23 DIAGNOSIS — M25.552 PAIN IN LEFT HIP: ICD-10-CM

## 2023-08-23 DIAGNOSIS — Z96.659 PRESENCE OF UNSPECIFIED ARTIFICIAL KNEE JOINT: Chronic | ICD-10-CM

## 2023-08-23 DIAGNOSIS — M25.571 PAIN IN RIGHT ANKLE AND JOINTS OF RIGHT FOOT: ICD-10-CM

## 2023-08-23 DIAGNOSIS — J20.9 ACUTE BRONCHITIS, UNSPECIFIED: ICD-10-CM

## 2023-08-23 PROCEDURE — 73502 X-RAY EXAM HIP UNI 2-3 VIEWS: CPT | Mod: LT

## 2023-08-23 PROCEDURE — 73502 X-RAY EXAM HIP UNI 2-3 VIEWS: CPT | Mod: 26,LT

## 2023-08-23 PROCEDURE — 71046 X-RAY EXAM CHEST 2 VIEWS: CPT

## 2023-08-23 PROCEDURE — 71046 X-RAY EXAM CHEST 2 VIEWS: CPT | Mod: 26

## 2023-08-24 DIAGNOSIS — M25.552 PAIN IN LEFT HIP: ICD-10-CM

## 2023-08-24 DIAGNOSIS — J20.9 ACUTE BRONCHITIS, UNSPECIFIED: ICD-10-CM

## 2023-09-06 ENCOUNTER — APPOINTMENT (OUTPATIENT)
Dept: ORTHOPEDIC SURGERY | Facility: CLINIC | Age: 73
End: 2023-09-06
Payer: MEDICARE

## 2023-09-06 PROCEDURE — L1902: CPT | Mod: RT

## 2023-09-06 PROCEDURE — 99214 OFFICE O/P EST MOD 30 MIN: CPT

## 2023-09-06 NOTE — PHYSICAL EXAM
[de-identified] : She is alert oriented x3.  Pleasant cooperative.  I examined the right lower extremity.  She demonstrates persistent discontinuity of the Achilles tendon at the insertion.  There is been no interval change.  Still has tenderness to palpation and an abnormal Garland's test.  She is neurovascular intact.

## 2023-09-06 NOTE — HISTORY OF PRESENT ILLNESS
[de-identified] : Patient returns today for follow-up of her right Achilles MRI and her right Achilles.  She still having instability when walking and occasional pain over the tendon.  She had an MRI done which we did discuss over the phone but she is here for formal discussion and discussion of treatment options.

## 2023-09-06 NOTE — DISCUSSION/SUMMARY
[de-identified] : I discussed the patient's findings with her.  Options at this point are either to treated nonsurgically with activity modification along with anti-inflammatories.  Or surgically with Achilles tendon secondary repair with allograft along with a flexor houses longus transfer.  We discussed the risks of surgery and the expected postoperative recovery protocol.  The risks of the surgery include but are not limited to infection, wound breakdown, rerupture, persistent pain, need for revision surgery, nerve damage, DVT, PE, loss of limb, loss of life.  The patient would like to proceed forward with surgery but is not able to do so till later this year.  In the meantime we will try to immobilize her in something less heavy.  I will see her back in the time of surgery and for subsequent postoperative visits.  All questions answered.

## 2023-09-06 NOTE — DATA REVIEWED
[FreeTextEntry1] : I reviewed the patient's right ankle MRI.  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  There is no fracture.

## 2023-09-13 ENCOUNTER — OUTPATIENT (OUTPATIENT)
Dept: OUTPATIENT SERVICES | Facility: HOSPITAL | Age: 73
LOS: 1 days | End: 2023-09-13
Payer: MEDICARE

## 2023-09-13 VITALS
OXYGEN SATURATION: 95 % | DIASTOLIC BLOOD PRESSURE: 71 MMHG | SYSTOLIC BLOOD PRESSURE: 126 MMHG | RESPIRATION RATE: 14 BRPM | TEMPERATURE: 96 F | HEIGHT: 61 IN | WEIGHT: 177.91 LBS | HEART RATE: 82 BPM

## 2023-09-13 DIAGNOSIS — Z96.652 PRESENCE OF LEFT ARTIFICIAL KNEE JOINT: Chronic | ICD-10-CM

## 2023-09-13 DIAGNOSIS — Z01.818 ENCOUNTER FOR OTHER PREPROCEDURAL EXAMINATION: ICD-10-CM

## 2023-09-13 DIAGNOSIS — Z96.651 PRESENCE OF RIGHT ARTIFICIAL KNEE JOINT: Chronic | ICD-10-CM

## 2023-09-13 DIAGNOSIS — S86.011D STRAIN OF RIGHT ACHILLES TENDON, SUBSEQUENT ENCOUNTER: ICD-10-CM

## 2023-09-13 DIAGNOSIS — Z96.659 PRESENCE OF UNSPECIFIED ARTIFICIAL KNEE JOINT: Chronic | ICD-10-CM

## 2023-09-13 LAB
ALBUMIN SERPL ELPH-MCNC: 4.1 G/DL — SIGNIFICANT CHANGE UP (ref 3.5–5.2)
ALP SERPL-CCNC: 84 U/L — SIGNIFICANT CHANGE UP (ref 30–115)
ALT FLD-CCNC: 14 U/L — SIGNIFICANT CHANGE UP (ref 0–41)
ANION GAP SERPL CALC-SCNC: 11 MMOL/L — SIGNIFICANT CHANGE UP (ref 7–14)
AST SERPL-CCNC: 16 U/L — SIGNIFICANT CHANGE UP (ref 0–41)
BASOPHILS # BLD AUTO: 0.07 K/UL — SIGNIFICANT CHANGE UP (ref 0–0.2)
BASOPHILS NFR BLD AUTO: 1.1 % — HIGH (ref 0–1)
BILIRUB SERPL-MCNC: 0.5 MG/DL — SIGNIFICANT CHANGE UP (ref 0.2–1.2)
BUN SERPL-MCNC: 22 MG/DL — HIGH (ref 10–20)
CALCIUM SERPL-MCNC: 9.1 MG/DL — SIGNIFICANT CHANGE UP (ref 8.4–10.5)
CHLORIDE SERPL-SCNC: 102 MMOL/L — SIGNIFICANT CHANGE UP (ref 98–110)
CO2 SERPL-SCNC: 25 MMOL/L — SIGNIFICANT CHANGE UP (ref 17–32)
CREAT SERPL-MCNC: 0.7 MG/DL — SIGNIFICANT CHANGE UP (ref 0.7–1.5)
EGFR: 92 ML/MIN/1.73M2 — SIGNIFICANT CHANGE UP
EOSINOPHIL # BLD AUTO: 0.47 K/UL — SIGNIFICANT CHANGE UP (ref 0–0.7)
EOSINOPHIL NFR BLD AUTO: 7.2 % — SIGNIFICANT CHANGE UP (ref 0–8)
GLUCOSE SERPL-MCNC: 93 MG/DL — SIGNIFICANT CHANGE UP (ref 70–99)
HCT VFR BLD CALC: 44 % — SIGNIFICANT CHANGE UP (ref 37–47)
HGB BLD-MCNC: 14.5 G/DL — SIGNIFICANT CHANGE UP (ref 12–16)
IMM GRANULOCYTES NFR BLD AUTO: 0.3 % — SIGNIFICANT CHANGE UP (ref 0.1–0.3)
LYMPHOCYTES # BLD AUTO: 1.8 K/UL — SIGNIFICANT CHANGE UP (ref 1.2–3.4)
LYMPHOCYTES # BLD AUTO: 27.6 % — SIGNIFICANT CHANGE UP (ref 20.5–51.1)
MCHC RBC-ENTMCNC: 29.5 PG — SIGNIFICANT CHANGE UP (ref 27–31)
MCHC RBC-ENTMCNC: 33 G/DL — SIGNIFICANT CHANGE UP (ref 32–37)
MCV RBC AUTO: 89.4 FL — SIGNIFICANT CHANGE UP (ref 81–99)
MONOCYTES # BLD AUTO: 0.61 K/UL — HIGH (ref 0.1–0.6)
MONOCYTES NFR BLD AUTO: 9.3 % — SIGNIFICANT CHANGE UP (ref 1.7–9.3)
NEUTROPHILS # BLD AUTO: 3.56 K/UL — SIGNIFICANT CHANGE UP (ref 1.4–6.5)
NEUTROPHILS NFR BLD AUTO: 54.5 % — SIGNIFICANT CHANGE UP (ref 42.2–75.2)
NRBC # BLD: 0 /100 WBCS — SIGNIFICANT CHANGE UP (ref 0–0)
PLATELET # BLD AUTO: 263 K/UL — SIGNIFICANT CHANGE UP (ref 130–400)
PMV BLD: 10.8 FL — HIGH (ref 7.4–10.4)
POTASSIUM SERPL-MCNC: 3.9 MMOL/L — SIGNIFICANT CHANGE UP (ref 3.5–5)
POTASSIUM SERPL-SCNC: 3.9 MMOL/L — SIGNIFICANT CHANGE UP (ref 3.5–5)
PROT SERPL-MCNC: 6.4 G/DL — SIGNIFICANT CHANGE UP (ref 6–8)
RBC # BLD: 4.92 M/UL — SIGNIFICANT CHANGE UP (ref 4.2–5.4)
RBC # FLD: 13.5 % — SIGNIFICANT CHANGE UP (ref 11.5–14.5)
SODIUM SERPL-SCNC: 138 MMOL/L — SIGNIFICANT CHANGE UP (ref 135–146)
WBC # BLD: 6.53 K/UL — SIGNIFICANT CHANGE UP (ref 4.8–10.8)
WBC # FLD AUTO: 6.53 K/UL — SIGNIFICANT CHANGE UP (ref 4.8–10.8)

## 2023-09-13 PROCEDURE — 85025 COMPLETE CBC W/AUTO DIFF WBC: CPT

## 2023-09-13 PROCEDURE — 36415 COLL VENOUS BLD VENIPUNCTURE: CPT

## 2023-09-13 PROCEDURE — 99214 OFFICE O/P EST MOD 30 MIN: CPT | Mod: 25

## 2023-09-13 PROCEDURE — 93010 ELECTROCARDIOGRAM REPORT: CPT

## 2023-09-13 PROCEDURE — 97116 GAIT TRAINING THERAPY: CPT | Mod: GP

## 2023-09-13 PROCEDURE — 80053 COMPREHEN METABOLIC PANEL: CPT

## 2023-09-13 PROCEDURE — 93005 ELECTROCARDIOGRAM TRACING: CPT

## 2023-09-13 RX ORDER — LOSARTAN POTASSIUM 100 MG/1
1 TABLET, FILM COATED ORAL
Qty: 0 | Refills: 0 | DISCHARGE

## 2023-09-13 RX ORDER — AMLODIPINE BESYLATE 2.5 MG/1
1 TABLET ORAL
Qty: 0 | Refills: 0 | DISCHARGE

## 2023-09-13 NOTE — H&P PST ADULT - REASON FOR ADMISSION
Case Type: OP Non-block TimeSuite: OR NorthProceduralist: Ruddy Chao  Confirmed Surgery DateTime: 09- - 0:00PAST DateTime: 09- - 7:15  Procedure: RIGHT ACHILLES SECONDARY REPAIR WITH ALLOGRAFT, FLEXOR HALLUCIS LONGUS TRANSFER AND CALCANEAL EXOSTECTOMY  ERP?: NoLaterality: RightLength of Procedure: 120 Minutes  Anesthesia Type: Regional  Vaccination Type: Pzifer  1st Dose received on: 03-  2nd Dose received on: 04-

## 2023-09-13 NOTE — H&P PST ADULT - HISTORY OF PRESENT ILLNESS
71 yo female presents for PAST in preparation for RIGHT ACHILLES SECONDARY REPAIR WITH ALLOGRAFT, FLEXOR HALLUCIS LONGUS TRANSFER AND CALCANEAL EXOSTECTOMY  Pt states that she missed three streps while getting the box out of her attic  in her house lost balance and "twisted her ankle" . Pt denies LOC, but went to  the hospital and miss diagnosed with "right ankle sprain" a  CT scan of the head was done  which was negative.  The fall occurred in April of 2023. Pt reports pain during ROM and weight blearing, rated as 8/10, takes Meloxicam PRN with relief.   PATIENT CURRENTLY DENIES CHEST PAIN    PALPITATIONS,  DYSURIA, OR URI WITHIN THE IN PAST 2 WEEKS    -Denies travel outside the [place] in the past 30 days  -Patient denies any signs or symptoms of COVID 19 and denies contact with known positive individuals.    -Patient was instructed to quarantine pre-procedure, practice exposure control measures, continue to self-monitor and report any concerns to their proceduralist.  -Pt advised self quarantine till day of procedure    ANESTHESIA ALERT  NO--Difficult Airway  NO--History of neck surgery or radiation  NO--Limited ROM of neck  NO--History of Malignant hyperthermia  NO--No personal or family history of Pseudocholinesterase deficiency.  NO--Prior Anesthesia Complication  NO--Latex Allergy  NO--Loose teeth  NO--History of Rheumatoid Arthritis  NO--Bleeding risk- Meloxicam PRN  NO--KEN  NO--Implants  NO--Other    -PT DENIES ANY RASHES, ABRASION, OR OPEN WOUNDS   -Pt denies any suicidal ideation or thoughts.  Pt states not a threat to self or others.  AS PER THE PT, THIS IS HIS/HER COMPLETE MEDICAL AND SURGICAL HX, INCLUDING MEDICATIONS PRESCRIBED AND OVER THE COUNTER  Patient verbalized understanding of instructions and was given the opportunity to ask questions and have them answered.    Revised Cardiac Risk Index for Pre-Operative Risk from [email or web address]  on 9/13/2023  ** All calculations should be rechecked by clinician prior to use **    RESULT SUMMARY:  0 points  Class I Risk    3.9 %  30-day risk of death, MI, or cardiac arrest  From Duceppe 2017, based on pooled data from 5 high quality external validations (4 prospective). These numbers are higher than those often quoted from the now-outdated original study (Derci 1999). See Evidence for details.      INPUTS:  Elevated-risk surgery —> 0 = No  History of ischemic heart disease —> 0 = No  History of congestive heart failure —> 0 = No  History of cerebrovascular disease —> 0 = No  Pre-operative treatment with insulin —> 0 = No  Pre-operative creatinine >2 mg/dL / 176.8 µmol/L —> 0 = No  D[employer] Activity Status Index (DASI) from [employer]  on 9/13/2023  ** All calculations should be rechecked by clinician prior to use **    RESULT SUMMARY:  44.7 points  The higher the score (maximum 58.2), the higher the functional status.    8.23 METs        INPUTS:  Take care of self —> 2.75 = Yes  Walk indoors —> 1.75 = Yes  Walk 1&ndash;2 blocks on level ground —> 2.75 = Yes  Climb a flight of stairs or walk up a hill —> 5.5 = Yes  Run a short distance —> 8 = Yes  Do light work around the house —> 2.7 = Yes  Do moderate work around the house —> 3.5 = Yes  Do heavy work around the house —> 8 = Yes  Do yardwork —> 4.5 = Yes  Have sexual relations —> 5.25 = Yes  Participate in moderate recreational activities —> 0 = No  Participate in strenuous sports —> 0 = No

## 2023-09-13 NOTE — H&P PST ADULT - NSICDXPASTSURGICALHX_GEN_ALL_CORE_FT
PAST SURGICAL HISTORY:  H/O total knee replacement 2015, left    H/O total knee replacement, right 2009    S/P total knee replacement, left

## 2023-09-13 NOTE — H&P PST ADULT - NSANTHOSAYNRD_GEN_A_CORE
PPD +  -CXR on 6/25 without any findings  -quant gold negative  -no respiratory symptoms currently No. KEN screening performed.  STOP BANG Legend: 0-2 = LOW Risk; 3-4 = INTERMEDIATE Risk; 5-8 = HIGH Risk

## 2023-09-13 NOTE — H&P PST ADULT - NSICDXPASTMEDICALHX_GEN_ALL_CORE_FT
PAST MEDICAL HISTORY:  Digital mucous cyst of finger right index finger    High cholesterol     HTN (hypertension)     Obese     Pain and swelling of ankle, right     S/P arthroscopy of right shoulder     Sleeping difficulty

## 2023-09-14 DIAGNOSIS — Z01.818 ENCOUNTER FOR OTHER PREPROCEDURAL EXAMINATION: ICD-10-CM

## 2023-09-14 DIAGNOSIS — S86.011D STRAIN OF RIGHT ACHILLES TENDON, SUBSEQUENT ENCOUNTER: ICD-10-CM

## 2023-09-18 NOTE — ASU PATIENT PROFILE, ADULT - FALL HARM RISK - RISK INTERVENTIONS

## 2023-09-19 ENCOUNTER — OUTPATIENT (OUTPATIENT)
Dept: OUTPATIENT SERVICES | Facility: HOSPITAL | Age: 73
LOS: 1 days | Discharge: HOME | End: 2023-09-19
Payer: MEDICARE

## 2023-09-19 ENCOUNTER — APPOINTMENT (OUTPATIENT)
Dept: ORTHOPEDIC SURGERY | Facility: AMBULATORY SURGERY CENTER | Age: 73
End: 2023-09-19

## 2023-09-19 ENCOUNTER — RESULT REVIEW (OUTPATIENT)
Age: 73
End: 2023-09-19

## 2023-09-19 ENCOUNTER — TRANSCRIPTION ENCOUNTER (OUTPATIENT)
Age: 73
End: 2023-09-19

## 2023-09-19 VITALS
RESPIRATION RATE: 18 BRPM | SYSTOLIC BLOOD PRESSURE: 144 MMHG | DIASTOLIC BLOOD PRESSURE: 71 MMHG | HEART RATE: 86 BPM | OXYGEN SATURATION: 97 % | TEMPERATURE: 98 F

## 2023-09-19 VITALS
HEIGHT: 62 IN | HEART RATE: 78 BPM | DIASTOLIC BLOOD PRESSURE: 72 MMHG | TEMPERATURE: 98 F | WEIGHT: 177.91 LBS | OXYGEN SATURATION: 96 % | RESPIRATION RATE: 16 BRPM | SYSTOLIC BLOOD PRESSURE: 149 MMHG

## 2023-09-19 DIAGNOSIS — Z96.651 PRESENCE OF RIGHT ARTIFICIAL KNEE JOINT: Chronic | ICD-10-CM

## 2023-09-19 DIAGNOSIS — Z96.652 PRESENCE OF LEFT ARTIFICIAL KNEE JOINT: Chronic | ICD-10-CM

## 2023-09-19 DIAGNOSIS — Z96.659 PRESENCE OF UNSPECIFIED ARTIFICIAL KNEE JOINT: Chronic | ICD-10-CM

## 2023-09-19 DIAGNOSIS — S86.011D STRAIN OF RIGHT ACHILLES TENDON, SUBSEQUENT ENCOUNTER: ICD-10-CM

## 2023-09-19 PROCEDURE — 28100 REMOVAL OF ANKLE/HEEL LESION: CPT | Mod: RT

## 2023-09-19 PROCEDURE — 88304 TISSUE EXAM BY PATHOLOGIST: CPT

## 2023-09-19 PROCEDURE — 88304 TISSUE EXAM BY PATHOLOGIST: CPT | Mod: 26

## 2023-09-19 PROCEDURE — 27691 REVISE LOWER LEG TENDON: CPT | Mod: RT

## 2023-09-19 PROCEDURE — C1713: CPT

## 2023-09-19 PROCEDURE — 27654 REPAIR OF ACHILLES TENDON: CPT | Mod: RT

## 2023-09-19 PROCEDURE — C1889: CPT

## 2023-09-19 RX ORDER — HYDROMORPHONE HYDROCHLORIDE 2 MG/ML
1 INJECTION INTRAMUSCULAR; INTRAVENOUS; SUBCUTANEOUS
Refills: 0 | Status: DISCONTINUED | OUTPATIENT
Start: 2023-09-19 | End: 2023-09-19

## 2023-09-19 RX ORDER — LOSARTAN/HYDROCHLOROTHIAZIDE 100MG-25MG
1 TABLET ORAL
Refills: 0 | DISCHARGE

## 2023-09-19 RX ORDER — SODIUM CHLORIDE 9 MG/ML
1000 INJECTION, SOLUTION INTRAVENOUS
Refills: 0 | Status: DISCONTINUED | OUTPATIENT
Start: 2023-09-19 | End: 2023-09-19

## 2023-09-19 RX ORDER — MELOXICAM 15 MG/1
1 TABLET ORAL
Refills: 0 | DISCHARGE

## 2023-09-19 RX ORDER — ZOLPIDEM TARTRATE 10 MG/1
1 TABLET ORAL
Qty: 0 | Refills: 0 | DISCHARGE

## 2023-09-19 RX ORDER — APREPITANT 80 MG/1
40 CAPSULE ORAL ONCE
Refills: 0 | Status: COMPLETED | OUTPATIENT
Start: 2023-09-19 | End: 2023-09-19

## 2023-09-19 RX ORDER — ONDANSETRON 8 MG/1
4 TABLET, FILM COATED ORAL ONCE
Refills: 0 | Status: DISCONTINUED | OUTPATIENT
Start: 2023-09-19 | End: 2023-09-19

## 2023-09-19 RX ORDER — HYDROMORPHONE HYDROCHLORIDE 2 MG/ML
0.5 INJECTION INTRAMUSCULAR; INTRAVENOUS; SUBCUTANEOUS
Refills: 0 | Status: DISCONTINUED | OUTPATIENT
Start: 2023-09-19 | End: 2023-09-19

## 2023-09-19 RX ADMIN — APREPITANT 40 MILLIGRAM(S): 80 CAPSULE ORAL at 08:35

## 2023-09-19 NOTE — CHART NOTE - NSCHARTNOTEFT_GEN_A_CORE
PACU ANESTHESIA ADMISSION NOTE      Procedure: Repair of right Achilles tendon    Transfer of flexor hallucis longus tendon      Post op diagnosis:  Achilles tendon tear, right, sequela        ____  Intubated  TV:______       Rate: ______      FiO2: ______    __x__  Patent Airway    __x__  Full return of protective reflexes    __x__  Full recovery from anesthesia / back to baseline status    Vitals:  T(C): 36.8 (09-19-23 @ 10:36), Max: 36.8 (09-19-23 @ 07:27)  HR: 78 (09-19-23 @ 10:36) (78 - 78)  BP: 149/72 (09-19-23 @ 10:36) (149/72 - 149/72)  RR: 16 (09-19-23 @ 10:36) (16 - 16)  SpO2: 96% (09-19-23 @ 10:36) (96% - 96%)    Mental Status:  __x__ Awake   ___x__ Alert   _____ Drowsy   _____ Sedated    Nausea/Vomiting:  __x__ NO  ______Yes,   See Post - Op Orders          Pain Scale (0-10):  __0___    Treatment: ____ None    __x__ See Post - Op/PCA Orders    Post - Operative Fluids:   ____ Oral   __x__ See Post - Op Orders    Plan: Discharge:   ____Home       _____Floor     _____Critical Care    _____  Other:_________________    Comments: Patient had smooth intraoperative event, no anesthesia complication.  PACU Vital signs: HR:    91        BP:   148     /    74      RR:     18       O2 Sat:  96     %     Temp 96.8

## 2023-09-19 NOTE — ASU DISCHARGE PLAN (ADULT/PEDIATRIC) - NS MD DC FALL RISK RISK
For information on Fall & Injury Prevention, visit: [email or web address] OR  [email or web address] OR  https://www.cdc.gov/steadi/patient.html

## 2023-09-19 NOTE — ASU DISCHARGE PLAN (ADULT/PEDIATRIC) - ASU DC SPECIAL INSTRUCTIONSFT
Please refer to preprinted instructions  Prescription for wheelchair attached Please refer to preprinted instructions  Prescription for wheelchair attached  please take all meds as sent to pharmacy to completion   non weight bearing   if increased pain fever swelling or discharge call md office

## 2023-09-22 DIAGNOSIS — M66.861 SPONTANEOUS RUPTURE OF OTHER TENDONS, RIGHT LOWER LEG: ICD-10-CM

## 2023-09-22 DIAGNOSIS — E78.00 PURE HYPERCHOLESTEROLEMIA, UNSPECIFIED: ICD-10-CM

## 2023-09-22 DIAGNOSIS — Z96.653 PRESENCE OF ARTIFICIAL KNEE JOINT, BILATERAL: ICD-10-CM

## 2023-09-22 DIAGNOSIS — E66.9 OBESITY, UNSPECIFIED: ICD-10-CM

## 2023-09-22 DIAGNOSIS — I10 ESSENTIAL (PRIMARY) HYPERTENSION: ICD-10-CM

## 2023-10-04 ENCOUNTER — APPOINTMENT (OUTPATIENT)
Dept: ORTHOPEDIC SURGERY | Facility: CLINIC | Age: 73
End: 2023-10-04
Payer: MEDICARE

## 2023-10-04 PROBLEM — M25.471 EFFUSION, RIGHT ANKLE: Chronic | Status: ACTIVE | Noted: 2023-09-13

## 2023-10-04 PROBLEM — E66.9 OBESITY, UNSPECIFIED: Chronic | Status: ACTIVE | Noted: 2023-09-13

## 2023-10-04 PROBLEM — Z98.890 OTHER SPECIFIED POSTPROCEDURAL STATES: Chronic | Status: ACTIVE | Noted: 2023-09-13

## 2023-10-04 PROCEDURE — 99024 POSTOP FOLLOW-UP VISIT: CPT

## 2023-10-18 ENCOUNTER — APPOINTMENT (OUTPATIENT)
Dept: ORTHOPEDIC SURGERY | Facility: CLINIC | Age: 73
End: 2023-10-18
Payer: MEDICARE

## 2023-10-18 PROCEDURE — L4361: CPT | Mod: KX,RT

## 2023-10-18 PROCEDURE — L3170: CPT | Mod: KX,RT

## 2023-10-18 PROCEDURE — 99024 POSTOP FOLLOW-UP VISIT: CPT

## 2023-10-19 ENCOUNTER — APPOINTMENT (OUTPATIENT)
Dept: ORTHOPEDIC SURGERY | Facility: CLINIC | Age: 73
End: 2023-10-19

## 2023-10-20 ENCOUNTER — APPOINTMENT (OUTPATIENT)
Dept: ORTHOPEDIC SURGERY | Facility: CLINIC | Age: 73
End: 2023-10-20

## 2023-10-26 ENCOUNTER — RX RENEWAL (OUTPATIENT)
Age: 73
End: 2023-10-26

## 2023-11-10 ENCOUNTER — RX RENEWAL (OUTPATIENT)
Age: 73
End: 2023-11-10

## 2023-11-15 ENCOUNTER — APPOINTMENT (OUTPATIENT)
Dept: ORTHOPEDIC SURGERY | Facility: CLINIC | Age: 73
End: 2023-11-15
Payer: MEDICARE

## 2023-11-15 PROCEDURE — 99024 POSTOP FOLLOW-UP VISIT: CPT

## 2023-11-21 ENCOUNTER — APPOINTMENT (OUTPATIENT)
Dept: ORTHOPEDIC SURGERY | Facility: CLINIC | Age: 73
End: 2023-11-21
Payer: MEDICARE

## 2023-11-21 VITALS — BODY MASS INDEX: 32.28 KG/M2 | WEIGHT: 171 LBS | HEIGHT: 61 IN

## 2023-11-21 PROCEDURE — 99213 OFFICE O/P EST LOW 20 MIN: CPT | Mod: 25

## 2023-11-21 PROCEDURE — 20611 DRAIN/INJ JOINT/BURSA W/US: CPT | Mod: 79,RT

## 2023-12-13 ENCOUNTER — APPOINTMENT (OUTPATIENT)
Dept: ORTHOPEDIC SURGERY | Facility: CLINIC | Age: 73
End: 2023-12-13

## 2023-12-18 ENCOUNTER — APPOINTMENT (OUTPATIENT)
Dept: ORTHOPEDIC SURGERY | Facility: CLINIC | Age: 73
End: 2023-12-18
Payer: MEDICARE

## 2023-12-18 DIAGNOSIS — S86.011D STRAIN OF RIGHT ACHILLES TENDON, SUBSEQUENT ENCOUNTER: ICD-10-CM

## 2023-12-18 PROCEDURE — 99024 POSTOP FOLLOW-UP VISIT: CPT

## 2023-12-18 NOTE — PHYSICAL EXAM
[de-identified] : Incision is well-healed.  No evidence of infection.  She has an intact Achilles repair.  She has full range of motion with plantarflexion.  She can dorsiflex to about 10 degrees past neutral.

## 2023-12-18 NOTE — DISCUSSION/SUMMARY
[de-identified] : I reassured the patient that she is doing well for this point postoperative course.  I would continue physical therapy.  I would encourage him to be more aggressive.  No restrictions.  I will see her back as needed.

## 2023-12-18 NOTE — HISTORY OF PRESENT ILLNESS
[de-identified] : 73-year-old patient here for postoperative exam.  She is status post a right Achilles tendon debridement and calcaneal exostectomy along with flexor houses longus transfer.  She is now exactly 3 months status post surgery.  Overall her pain level is improving.  She is able to dance in normal shoes at her daughter-in-law's wedding.  She does not endorse any new pain or interval trauma.

## 2024-01-02 ENCOUNTER — APPOINTMENT (OUTPATIENT)
Dept: CARDIOLOGY | Facility: CLINIC | Age: 74
End: 2024-01-02
Payer: MEDICARE

## 2024-01-02 VITALS
HEART RATE: 79 BPM | BODY MASS INDEX: 31.91 KG/M2 | SYSTOLIC BLOOD PRESSURE: 140 MMHG | WEIGHT: 169 LBS | DIASTOLIC BLOOD PRESSURE: 72 MMHG | HEIGHT: 61 IN

## 2024-01-02 VITALS — SYSTOLIC BLOOD PRESSURE: 136 MMHG | DIASTOLIC BLOOD PRESSURE: 80 MMHG

## 2024-01-02 DIAGNOSIS — R53.83 OTHER FATIGUE: ICD-10-CM

## 2024-01-02 PROCEDURE — 99214 OFFICE O/P EST MOD 30 MIN: CPT | Mod: 25

## 2024-01-02 PROCEDURE — 93000 ELECTROCARDIOGRAM COMPLETE: CPT

## 2024-01-02 RX ORDER — DOCUSATE SODIUM 100 MG/1
100 CAPSULE ORAL 3 TIMES DAILY
Qty: 15 | Refills: 0 | Status: DISCONTINUED | COMMUNITY
Start: 2023-09-27 | End: 2024-01-02

## 2024-01-02 RX ORDER — ACETAMINOPHEN 500 MG/1
500 TABLET, COATED ORAL
Qty: 40 | Refills: 0 | Status: DISCONTINUED | COMMUNITY
Start: 2023-09-18 | End: 2024-01-02

## 2024-01-02 RX ORDER — ONDANSETRON 4 MG/1
4 TABLET, ORALLY DISINTEGRATING ORAL 3 TIMES DAILY
Qty: 21 | Refills: 0 | Status: DISCONTINUED | COMMUNITY
Start: 2023-09-18 | End: 2024-01-02

## 2024-01-02 RX ORDER — OXYCODONE 5 MG/1
5 TABLET ORAL
Qty: 30 | Refills: 0 | Status: DISCONTINUED | COMMUNITY
Start: 2023-09-18 | End: 2024-01-02

## 2024-01-02 RX ORDER — ASPIRIN 81 MG/1
81 TABLET, COATED ORAL
Qty: 60 | Refills: 0 | Status: DISCONTINUED | COMMUNITY
Start: 2023-09-18 | End: 2024-01-02

## 2024-01-02 RX ORDER — PRAVASTATIN SODIUM 40 MG/1
40 TABLET ORAL
Qty: 90 | Refills: 3 | Status: ACTIVE | COMMUNITY
Start: 1900-01-01 | End: 1900-01-01

## 2024-01-02 RX ORDER — BISACODYL 10 MG/1
10 SUPPOSITORY RECTAL DAILY
Qty: 3 | Refills: 0 | Status: DISCONTINUED | COMMUNITY
Start: 2023-09-27 | End: 2024-01-02

## 2024-01-02 RX ORDER — MELOXICAM 7.5 MG/1
7.5 TABLET ORAL
Qty: 30 | Refills: 1 | Status: DISCONTINUED | COMMUNITY
Start: 2023-04-21 | End: 2024-01-02

## 2024-01-02 RX ORDER — CLINDAMYCIN HYDROCHLORIDE 150 MG/1
150 CAPSULE ORAL EVERY 6 HOURS
Qty: 20 | Refills: 0 | Status: DISCONTINUED | COMMUNITY
Start: 2023-09-18 | End: 2024-01-02

## 2024-01-02 NOTE — REASON FOR VISIT
[CV Risk Factors and Non-Cardiac Disease] : CV risk factors and non-cardiac disease [Hyperlipidemia] : hyperlipidemia [FreeTextEntry3] : Dr. Bermeo  [Follow-Up - Clinic] : a clinic follow-up of [Hypertension] : hypertension

## 2024-01-02 NOTE — CARDIOLOGY SUMMARY
[de-identified] : 9- NSR Normal ECG .  2-9-2022  NSR LVH NS  twave change 4- NSR NS  t wave change  LVH  1-2-2024 NSR NS T wave change .  8- NSR Normal ECG  [id number] LVH  2- NSR LVH NS  Twav ehcnag e.  [de-identified] : 4- EPATCH No arrhythmias .  2-2023 EPATCH no signficant arrhythmais .  [de-identified] : 11- ETT Echo completed 3 minutes No ischemia . trace MR and TR  [de-identified] : 4- Normal Lv systolic functin Mild MR trace TR  [___] : [unfilled]

## 2024-01-02 NOTE — ASSESSMENT
[FreeTextEntry1] : The patient has hadissues with her right shulder and right Achilles tenden . The patient has not had CP or SOB . She had some KIMBLE with her a URI possibly influenza . The patient has had a negative ischemia work up . She has increased choesterol which is not at goa.

## 2024-01-06 LAB
ALBUMIN SERPL ELPH-MCNC: 4.1 G/DL
ALP BLD-CCNC: 92 U/L
ALT SERPL-CCNC: 13 U/L
ANION GAP SERPL CALC-SCNC: 12 MMOL/L
AST SERPL-CCNC: 17 U/L
BILIRUB SERPL-MCNC: 0.3 MG/DL
BUN SERPL-MCNC: 17 MG/DL
CALCIUM SERPL-MCNC: 9.4 MG/DL
CHLORIDE SERPL-SCNC: 102 MMOL/L
CHOLEST SERPL-MCNC: 185 MG/DL
CO2 SERPL-SCNC: 24 MMOL/L
CREAT SERPL-MCNC: 0.7 MG/DL
EGFR: 92 ML/MIN/1.73M2
GLUCOSE SERPL-MCNC: 135 MG/DL
HCT VFR BLD CALC: 45.6 %
HDLC SERPL-MCNC: 45 MG/DL
HGB BLD-MCNC: 15 G/DL
LDLC SERPL CALC-MCNC: 128 MG/DL
MCHC RBC-ENTMCNC: 29.9 PG
MCHC RBC-ENTMCNC: 32.9 G/DL
MCV RBC AUTO: 90.8 FL
NONHDLC SERPL-MCNC: 140 MG/DL
PLATELET # BLD AUTO: 345 K/UL
PMV BLD: 10.6 FL
POTASSIUM SERPL-SCNC: 4.3 MMOL/L
PROT SERPL-MCNC: 7 G/DL
RBC # BLD: 5.02 M/UL
RBC # FLD: 13 %
SODIUM SERPL-SCNC: 138 MMOL/L
TRIGL SERPL-MCNC: 59 MG/DL
WBC # FLD AUTO: 7.07 K/UL

## 2024-01-07 ENCOUNTER — RX RENEWAL (OUTPATIENT)
Age: 74
End: 2024-01-07

## 2024-01-23 ENCOUNTER — APPOINTMENT (OUTPATIENT)
Dept: ORTHOPEDIC SURGERY | Facility: CLINIC | Age: 74
End: 2024-01-23
Payer: MEDICARE

## 2024-01-23 DIAGNOSIS — M75.01 ADHESIVE CAPSULITIS OF RIGHT SHOULDER: ICD-10-CM

## 2024-01-23 PROCEDURE — 99213 OFFICE O/P EST LOW 20 MIN: CPT

## 2024-01-23 NOTE — IMAGING
[de-identified] : Physical exam of the right shoulder: Active range of motion with forward flexion to 100 degrees, passive range of motion to 180 degrees, active range of motion with abduction to 95 degrees, active range of motion with internal rotation L2.  She still has significant weakness with rotator cuff resistance testing.  Intact to light touch.

## 2024-01-23 NOTE — HISTORY OF PRESENT ILLNESS
[de-identified] : The patient is a 73-year-old female here for a subsequent reevaluation of her right shoulder.  She is status post right shoulder arthroscopy, rotator cuff repair on 5/31/2023 with Dr. Moreno.  She had developed a subsequent right frozen shoulder.  She had an injection of cortisone for the right shoulder at her previous office visit here.  She is doing formal physical therapy at [facility].  She reports her shoulder feels about the same.

## 2024-01-23 NOTE — DISCUSSION/SUMMARY
[de-identified] : At this point she now has a resolving right shoulder adhesive capsulitis.  I recommend she returns to formal physical therapy especially for range of motion and rotator cuff and deltoid strengthening.  Rx provided for that.  She will follow-up in 6 weeks for further evaluation with Dr. Moreno.

## 2024-03-05 ENCOUNTER — APPOINTMENT (OUTPATIENT)
Dept: ORTHOPEDIC SURGERY | Facility: CLINIC | Age: 74
End: 2024-03-05
Payer: MEDICARE

## 2024-03-05 DIAGNOSIS — M25.511 PAIN IN RIGHT SHOULDER: ICD-10-CM

## 2024-03-05 PROCEDURE — 99213 OFFICE O/P EST LOW 20 MIN: CPT

## 2024-03-18 NOTE — HISTORY OF PRESENT ILLNESS
[de-identified] : Pt is s/p right shoulder arthroscopy  having recurrent pain to shoulder  NAD Right shoulder Incisions healed Mild diffuse TTP Compartments soft and NT ff 140 with pain NVI  s/p right shoulder arthroscopy went over the surgery in detail cont pt and pain control will get repeat mri to assess RC integrity

## 2024-04-03 ENCOUNTER — OUTPATIENT (OUTPATIENT)
Dept: OUTPATIENT SERVICES | Facility: HOSPITAL | Age: 74
LOS: 1 days | End: 2024-04-03
Payer: MEDICARE

## 2024-04-03 ENCOUNTER — RESULT REVIEW (OUTPATIENT)
Age: 74
End: 2024-04-03

## 2024-04-03 DIAGNOSIS — Z96.659 PRESENCE OF UNSPECIFIED ARTIFICIAL KNEE JOINT: Chronic | ICD-10-CM

## 2024-04-03 DIAGNOSIS — Z96.652 PRESENCE OF LEFT ARTIFICIAL KNEE JOINT: Chronic | ICD-10-CM

## 2024-04-03 DIAGNOSIS — M25.511 PAIN IN RIGHT SHOULDER: ICD-10-CM

## 2024-04-03 DIAGNOSIS — Z00.8 ENCOUNTER FOR OTHER GENERAL EXAMINATION: ICD-10-CM

## 2024-04-03 DIAGNOSIS — Z96.651 PRESENCE OF RIGHT ARTIFICIAL KNEE JOINT: Chronic | ICD-10-CM

## 2024-04-03 PROCEDURE — 73221 MRI JOINT UPR EXTREM W/O DYE: CPT | Mod: 26,RT

## 2024-04-03 PROCEDURE — 73221 MRI JOINT UPR EXTREM W/O DYE: CPT | Mod: RT

## 2024-04-04 DIAGNOSIS — M25.511 PAIN IN RIGHT SHOULDER: ICD-10-CM

## 2024-04-08 ENCOUNTER — NON-APPOINTMENT (OUTPATIENT)
Age: 74
End: 2024-04-08

## 2024-04-15 ENCOUNTER — LABORATORY RESULT (OUTPATIENT)
Age: 74
End: 2024-04-15

## 2024-04-16 ENCOUNTER — APPOINTMENT (OUTPATIENT)
Dept: OBGYN | Facility: CLINIC | Age: 74
End: 2024-04-16
Payer: MEDICARE

## 2024-04-16 VITALS — BODY MASS INDEX: 34.75 KG/M2 | WEIGHT: 177 LBS | HEIGHT: 60 IN | TEMPERATURE: 97.2 F

## 2024-04-16 DIAGNOSIS — Z01.419 ENCOUNTER FOR GYNECOLOGICAL EXAMINATION (GENERAL) (ROUTINE) W/OUT ABNORMAL FINDINGS: ICD-10-CM

## 2024-04-16 DIAGNOSIS — N39.3 STRESS INCONTINENCE (FEMALE) (MALE): ICD-10-CM

## 2024-04-16 DIAGNOSIS — Z78.0 ASYMPTOMATIC MENOPAUSAL STATE: ICD-10-CM

## 2024-04-16 PROCEDURE — 77080 DXA BONE DENSITY AXIAL: CPT

## 2024-04-16 PROCEDURE — 99397 PER PM REEVAL EST PAT 65+ YR: CPT

## 2024-04-17 ENCOUNTER — APPOINTMENT (OUTPATIENT)
Dept: OBGYN | Facility: CLINIC | Age: 74
End: 2024-04-17
Payer: MEDICARE

## 2024-04-17 PROCEDURE — 76830 TRANSVAGINAL US NON-OB: CPT

## 2024-04-18 ENCOUNTER — NON-APPOINTMENT (OUTPATIENT)
Age: 74
End: 2024-04-18

## 2024-04-19 PROBLEM — N39.3 URINARY, INCONTINENCE, STRESS FEMALE: Status: ACTIVE | Noted: 2020-01-31

## 2024-04-19 PROBLEM — Z78.0 MENOPAUSE: Status: ACTIVE | Noted: 2018-08-19

## 2024-04-19 PROBLEM — Z01.419 WELL WOMAN EXAM WITH ROUTINE GYNECOLOGICAL EXAM: Status: ACTIVE | Noted: 2018-08-19

## 2024-05-10 ENCOUNTER — RESULT REVIEW (OUTPATIENT)
Age: 74
End: 2024-05-10

## 2024-05-10 ENCOUNTER — OUTPATIENT (OUTPATIENT)
Dept: OUTPATIENT SERVICES | Facility: HOSPITAL | Age: 74
LOS: 1 days | End: 2024-05-10
Payer: MEDICARE

## 2024-05-10 DIAGNOSIS — Z96.651 PRESENCE OF RIGHT ARTIFICIAL KNEE JOINT: Chronic | ICD-10-CM

## 2024-05-10 DIAGNOSIS — Z96.652 PRESENCE OF LEFT ARTIFICIAL KNEE JOINT: Chronic | ICD-10-CM

## 2024-05-10 DIAGNOSIS — Z12.31 ENCOUNTER FOR SCREENING MAMMOGRAM FOR MALIGNANT NEOPLASM OF BREAST: ICD-10-CM

## 2024-05-10 DIAGNOSIS — Z96.659 PRESENCE OF UNSPECIFIED ARTIFICIAL KNEE JOINT: Chronic | ICD-10-CM

## 2024-05-10 PROCEDURE — 77063 BREAST TOMOSYNTHESIS BI: CPT | Mod: 26

## 2024-05-10 PROCEDURE — 77067 SCR MAMMO BI INCL CAD: CPT | Mod: 26

## 2024-05-10 PROCEDURE — 77063 BREAST TOMOSYNTHESIS BI: CPT

## 2024-05-10 PROCEDURE — 77067 SCR MAMMO BI INCL CAD: CPT

## 2024-05-11 DIAGNOSIS — Z12.31 ENCOUNTER FOR SCREENING MAMMOGRAM FOR MALIGNANT NEOPLASM OF BREAST: ICD-10-CM

## 2024-05-13 ENCOUNTER — NON-APPOINTMENT (OUTPATIENT)
Age: 74
End: 2024-05-13

## 2024-05-30 ENCOUNTER — APPOINTMENT (OUTPATIENT)
Dept: PLASTIC SURGERY | Facility: CLINIC | Age: 74
End: 2024-05-30
Payer: MEDICARE

## 2024-05-30 PROCEDURE — 20550 NJX 1 TENDON SHEATH/LIGAMENT: CPT | Mod: LT

## 2024-05-30 PROCEDURE — 99212 OFFICE O/P EST SF 10 MIN: CPT | Mod: 25

## 2024-05-30 NOTE — HISTORY OF PRESENT ILLNESS
[FreeTextEntry1] : Pt is a 69 y/o F, RHD, with PMH of HTN, HLD, and insomnia who presents for evaluation of right index finger mass x2 months. Denies pain, bleeding, or drainage. No XRs done. Denies trauma.\par  \par  Also c/o left hand pain. Has been diagnosed with Keinbock's disease. C/o intermittent pain to dorsal hand that radiates up her arm and wakes her up at night. Denies paraesthesias but c/o weakness. Had EMG done several years ago.\par  \par  nonsmoker, nondiabetic\par  Occ:  at [facility]\par  \par  Interval hx (4/19/21). Patient presents today POD#7 s/p excision of right index mucous cyst. Doing well c/o slight discomfort and swelling. Denies any f/c or bleeding. \par  \par  Interval hx (4/26/21): Pt presents today POD #14 s/p excision of right index mucous cyst. C/o mild incisional tenderness as expected. Denies f/c or drainage.\par  \par  Interval hx (5/4/21): Pt presents today 3 week s/p excision of right index mucous cyst c/o right index finger burning, redness and swelling. Denies any f/c or drainage. No hand trauma. \par  \par  Interval hx (5/24/21): Pt presents today 6 weeks postop. Completed PO Doxycycline as prescribed. C/o persistent though improved redness. Denies pain, drainage, or f/c.\par  \par  Interval hx (6/29/21): Pt presents today 10 weeks postop. Had XR done- no evidence of OM or septic arthritis. C/o limited ROM and occasional discomfort.

## 2024-05-30 NOTE — DATA REVIEWED
[FreeTextEntry1] : \par   Pathology             Final\par  \par  No Documents Attached\par  \par  \par  \par  \par    [facility] Accession Number : [id number]\par  \par  GIULIA POWER                   1\par  \par  \par  \par  Surgical Final Report\par  \par  \par  \par  Final Diagnosis\par  Labelled as right index finger mucous cyst:\par  - Fragments of osteocartilage and fibroconnective tissue\par  suggestive of mucous\par  cyst.\par  \par  Verified by: Danika Saenz MD\par  (Electronic Signature)\par  Reported on: 04/14/21 12:32 EDT, [address] [place] [id number]\par  Phone: [phone]   Fax: [phone]\par  _________________________________________________________________\par  \par  Clinical History\par  Excision\par  Right index finger cyst x several months\par  COVID (-)\par  \par  Specimen(s) Submitted\par  Right index finger mucous cyst\par  \par  Gross Description\par  The specimen is received in formalin, labeled "right index finger\par  mucous cyst" and consists of multiple irregular pieces of tan to\par  light brown soft tissue measuring 0.5 x 0.3 x 0.2 cm in\par  aggregate. The specimen is submitted entirely. (1 block)\par  \par  Specimen was received and underwent gross examination at [facility], [address] [place] [address].\par  \par  04/13/2021 09:05:22 EDT rr\par  \par  Perioperative Diagnosis\par  Right index finger mucous cyst\par  \par   \par  \par   Ordered by: TERRI BERRY IV       Collected/Examined: 12Apr2021 09:00AM       \par  Verification Required       Stage: Final       \par   Performed at: [facility]       Resulted: 14Apr2021 12:32PM       Last Updated: 14Apr2021 12:33PM       Accession: [id number]

## 2024-05-30 NOTE — PHYSICAL EXAM
[de-identified] : well-appearing, NAD [de-identified] : bilateral hands with OA changes, normal digital cascade and FROM, SILT, negative Phalen's and Tinels, right index dorsal DIP incision well-healed, nontender, no open wounds or drainage, limited terminal flexion

## 2024-05-30 NOTE — ASSESSMENT
[FreeTextEntry1] : 69 y/o F with right index finger mucous cyst and left hand Keinbock's disease now 10 weeks s/p excision of mucous cyst. Doing well with early cellulitis, resolved after PO abx   -OT for ROM -F/u 1 month  Due to COVID 19, pre-visit patient instructions were explained to the patient and their symptoms were checked upon arrival.   Masks were used by the health care providers and staff and the examination room was cleaned after the patient visit was completed.   9/8/2022 as above triggering left index finger suggested and injected 5mg kenalog tolerated well  f/u in 2 months if not better  Due to COVID 19, pre-visit patient instructions were explained to the patient and their symptoms were checked upon arrival.   Masks were used by the health care providers and staff and the examination room was cleaned after the patient visit was completed.  5/30/2024 as above here today w triggering left index (one prior inj) and left fourth  injected 5mg kenalog into left 2+4  george well  Regarding treatment of trigger finger patient aware of possible lack of response to steroid injection and possible need for additional steroid injection or possible surgical trigger finger release.  All questions answered

## 2024-07-02 ENCOUNTER — APPOINTMENT (OUTPATIENT)
Dept: CARDIOLOGY | Facility: CLINIC | Age: 74
End: 2024-07-02
Payer: MEDICARE

## 2024-07-02 ENCOUNTER — RESULT CHARGE (OUTPATIENT)
Age: 74
End: 2024-07-02

## 2024-07-02 VITALS
SYSTOLIC BLOOD PRESSURE: 125 MMHG | HEIGHT: 60 IN | BODY MASS INDEX: 34.75 KG/M2 | DIASTOLIC BLOOD PRESSURE: 75 MMHG | HEART RATE: 78 BPM | WEIGHT: 177 LBS

## 2024-07-02 DIAGNOSIS — R00.2 PALPITATIONS: ICD-10-CM

## 2024-07-02 DIAGNOSIS — E78.5 HYPERLIPIDEMIA, UNSPECIFIED: ICD-10-CM

## 2024-07-02 DIAGNOSIS — G47.33 OBSTRUCTIVE SLEEP APNEA (ADULT) (PEDIATRIC): ICD-10-CM

## 2024-07-02 DIAGNOSIS — I10 ESSENTIAL (PRIMARY) HYPERTENSION: ICD-10-CM

## 2024-07-02 DIAGNOSIS — R73.03 PREDIABETES.: ICD-10-CM

## 2024-07-02 PROCEDURE — 99214 OFFICE O/P EST MOD 30 MIN: CPT | Mod: 25

## 2024-07-02 PROCEDURE — 93000 ELECTROCARDIOGRAM COMPLETE: CPT

## 2024-07-02 RX ORDER — CETIRIZINE HCL 10 MG
10 TABLET ORAL DAILY
Refills: 0 | Status: ACTIVE | COMMUNITY

## 2024-08-12 ENCOUNTER — APPOINTMENT (OUTPATIENT)
Dept: ORTHOPEDIC SURGERY | Facility: CLINIC | Age: 74
End: 2024-08-12
Payer: COMMERCIAL

## 2024-08-12 DIAGNOSIS — S86.011D STRAIN OF RIGHT ACHILLES TENDON, SUBSEQUENT ENCOUNTER: ICD-10-CM

## 2024-08-12 PROCEDURE — 99203 OFFICE O/P NEW LOW 30 MIN: CPT

## 2024-08-12 NOTE — DISCUSSION/SUMMARY
[de-identified] : I discussed the patient's findings with her.  At this time the patient should undergo mobilization.  I do not think she necessarily needs a boot at this time although I did offer that to her.  She will start off with an Ace bandage.  She will modify her activity.  Utilize anti-inflammatories.  I will see her back she fails to improve in 2 to 3 weeks.  All questions sought and answered

## 2024-08-12 NOTE — HISTORY OF PRESENT ILLNESS
[de-identified] : 73-year-old patient well-known to me for her right Achilles tendon repair which was performed by myself just shy of a year ago.  The patient notes that she had been doing great up until couple days ago when she began noticing more pain involving the front part of the foot.  There was no injury that she can recall or though she does remember feeling a sudden flash of pain while standing.  Today she reports that she is able to walk but with pain involving the forefoot.  Denies any calf pain chest pain or shortness of breath.

## 2024-08-12 NOTE — PHYSICAL EXAM
[de-identified] : Her incision is well-healed.  No evidence of infection.  She is nontender throughout the entire Achilles.  She is somewhat tender at the forefoot.  No skin abnormality.  Neurovascular intact distally.

## 2024-11-11 ENCOUNTER — EMERGENCY (EMERGENCY)
Facility: HOSPITAL | Age: 74
LOS: 0 days | Discharge: HOME | End: 2024-11-12
Attending: STUDENT IN AN ORGANIZED HEALTH CARE EDUCATION/TRAINING PROGRAM
Payer: MEDICARE

## 2024-11-11 VITALS
HEART RATE: 92 BPM | SYSTOLIC BLOOD PRESSURE: 144 MMHG | OXYGEN SATURATION: 94 % | RESPIRATION RATE: 20 BRPM | DIASTOLIC BLOOD PRESSURE: 91 MMHG | TEMPERATURE: 98 F | WEIGHT: 177.91 LBS | HEIGHT: 61 IN

## 2024-11-11 DIAGNOSIS — Z96.651 PRESENCE OF RIGHT ARTIFICIAL KNEE JOINT: Chronic | ICD-10-CM

## 2024-11-11 DIAGNOSIS — Z96.659 PRESENCE OF UNSPECIFIED ARTIFICIAL KNEE JOINT: Chronic | ICD-10-CM

## 2024-11-11 DIAGNOSIS — R06.02 SHORTNESS OF BREATH: ICD-10-CM

## 2024-11-11 DIAGNOSIS — R00.2 PALPITATIONS: ICD-10-CM

## 2024-11-11 DIAGNOSIS — I10 ESSENTIAL (PRIMARY) HYPERTENSION: ICD-10-CM

## 2024-11-11 DIAGNOSIS — R05.9 COUGH, UNSPECIFIED: ICD-10-CM

## 2024-11-11 DIAGNOSIS — M54.2 CERVICALGIA: ICD-10-CM

## 2024-11-11 DIAGNOSIS — R07.89 OTHER CHEST PAIN: ICD-10-CM

## 2024-11-11 DIAGNOSIS — Z96.652 PRESENCE OF LEFT ARTIFICIAL KNEE JOINT: Chronic | ICD-10-CM

## 2024-11-11 DIAGNOSIS — Z88.0 ALLERGY STATUS TO PENICILLIN: ICD-10-CM

## 2024-11-11 DIAGNOSIS — E78.5 HYPERLIPIDEMIA, UNSPECIFIED: ICD-10-CM

## 2024-11-11 LAB
ALBUMIN SERPL ELPH-MCNC: 3.8 G/DL — SIGNIFICANT CHANGE UP (ref 3.5–5.2)
ALP SERPL-CCNC: 101 U/L — SIGNIFICANT CHANGE UP (ref 30–115)
ALT FLD-CCNC: 13 U/L — SIGNIFICANT CHANGE UP (ref 0–41)
ANION GAP SERPL CALC-SCNC: 13 MMOL/L — SIGNIFICANT CHANGE UP (ref 7–14)
AST SERPL-CCNC: 16 U/L — SIGNIFICANT CHANGE UP (ref 0–41)
BASOPHILS # BLD AUTO: 0.08 K/UL — SIGNIFICANT CHANGE UP (ref 0–0.2)
BASOPHILS NFR BLD AUTO: 0.9 % — SIGNIFICANT CHANGE UP (ref 0–1)
BILIRUB SERPL-MCNC: 0.3 MG/DL — SIGNIFICANT CHANGE UP (ref 0.2–1.2)
BUN SERPL-MCNC: 20 MG/DL — SIGNIFICANT CHANGE UP (ref 10–20)
CALCIUM SERPL-MCNC: 9.3 MG/DL — SIGNIFICANT CHANGE UP (ref 8.4–10.5)
CHLORIDE SERPL-SCNC: 100 MMOL/L — SIGNIFICANT CHANGE UP (ref 98–110)
CO2 SERPL-SCNC: 26 MMOL/L — SIGNIFICANT CHANGE UP (ref 17–32)
CREAT SERPL-MCNC: 0.6 MG/DL — LOW (ref 0.7–1.5)
EGFR: 95 ML/MIN/1.73M2 — SIGNIFICANT CHANGE UP
EOSINOPHIL # BLD AUTO: 0.59 K/UL — SIGNIFICANT CHANGE UP (ref 0–0.7)
EOSINOPHIL NFR BLD AUTO: 6.8 % — SIGNIFICANT CHANGE UP (ref 0–8)
GLUCOSE SERPL-MCNC: 97 MG/DL — SIGNIFICANT CHANGE UP (ref 70–99)
HCT VFR BLD CALC: 41.5 % — SIGNIFICANT CHANGE UP (ref 37–47)
HGB BLD-MCNC: 14.1 G/DL — SIGNIFICANT CHANGE UP (ref 12–16)
IMM GRANULOCYTES NFR BLD AUTO: 0.5 % — HIGH (ref 0.1–0.3)
LIDOCAIN IGE QN: 26 U/L — SIGNIFICANT CHANGE UP (ref 7–60)
LYMPHOCYTES # BLD AUTO: 1.74 K/UL — SIGNIFICANT CHANGE UP (ref 1.2–3.4)
LYMPHOCYTES # BLD AUTO: 19.9 % — LOW (ref 20.5–51.1)
MAGNESIUM SERPL-MCNC: 1.9 MG/DL — SIGNIFICANT CHANGE UP (ref 1.8–2.4)
MCHC RBC-ENTMCNC: 30.1 PG — SIGNIFICANT CHANGE UP (ref 27–31)
MCHC RBC-ENTMCNC: 34 G/DL — SIGNIFICANT CHANGE UP (ref 32–37)
MCV RBC AUTO: 88.5 FL — SIGNIFICANT CHANGE UP (ref 81–99)
MONOCYTES # BLD AUTO: 0.78 K/UL — HIGH (ref 0.1–0.6)
MONOCYTES NFR BLD AUTO: 8.9 % — SIGNIFICANT CHANGE UP (ref 1.7–9.3)
NEUTROPHILS # BLD AUTO: 5.5 K/UL — SIGNIFICANT CHANGE UP (ref 1.4–6.5)
NEUTROPHILS NFR BLD AUTO: 63 % — SIGNIFICANT CHANGE UP (ref 42.2–75.2)
NRBC # BLD: 0 /100 WBCS — SIGNIFICANT CHANGE UP (ref 0–0)
PLATELET # BLD AUTO: 304 K/UL — SIGNIFICANT CHANGE UP (ref 130–400)
PMV BLD: 10.1 FL — SIGNIFICANT CHANGE UP (ref 7.4–10.4)
POTASSIUM SERPL-MCNC: 3.8 MMOL/L — SIGNIFICANT CHANGE UP (ref 3.5–5)
POTASSIUM SERPL-SCNC: 3.8 MMOL/L — SIGNIFICANT CHANGE UP (ref 3.5–5)
PROT SERPL-MCNC: 6.3 G/DL — SIGNIFICANT CHANGE UP (ref 6–8)
RBC # BLD: 4.69 M/UL — SIGNIFICANT CHANGE UP (ref 4.2–5.4)
RBC # FLD: 13.2 % — SIGNIFICANT CHANGE UP (ref 11.5–14.5)
SODIUM SERPL-SCNC: 139 MMOL/L — SIGNIFICANT CHANGE UP (ref 135–146)
TROPONIN T, HIGH SENSITIVITY RESULT: <6 NG/L — SIGNIFICANT CHANGE UP (ref 6–13)
WBC # BLD: 8.73 K/UL — SIGNIFICANT CHANGE UP (ref 4.8–10.8)
WBC # FLD AUTO: 8.73 K/UL — SIGNIFICANT CHANGE UP (ref 4.8–10.8)

## 2024-11-11 PROCEDURE — 71046 X-RAY EXAM CHEST 2 VIEWS: CPT

## 2024-11-11 PROCEDURE — 93010 ELECTROCARDIOGRAM REPORT: CPT

## 2024-11-11 PROCEDURE — 83690 ASSAY OF LIPASE: CPT

## 2024-11-11 PROCEDURE — 93005 ELECTROCARDIOGRAM TRACING: CPT

## 2024-11-11 PROCEDURE — 83735 ASSAY OF MAGNESIUM: CPT

## 2024-11-11 PROCEDURE — 96374 THER/PROPH/DIAG INJ IV PUSH: CPT

## 2024-11-11 PROCEDURE — 99285 EMERGENCY DEPT VISIT HI MDM: CPT | Mod: 25

## 2024-11-11 PROCEDURE — 71046 X-RAY EXAM CHEST 2 VIEWS: CPT | Mod: 26

## 2024-11-11 PROCEDURE — G0378: CPT

## 2024-11-11 PROCEDURE — 78452 HT MUSCLE IMAGE SPECT MULT: CPT | Mod: MC

## 2024-11-11 PROCEDURE — 93010 ELECTROCARDIOGRAM REPORT: CPT | Mod: 77

## 2024-11-11 PROCEDURE — 93017 CV STRESS TEST TRACING ONLY: CPT

## 2024-11-11 PROCEDURE — A9500: CPT

## 2024-11-11 PROCEDURE — 99223 1ST HOSP IP/OBS HIGH 75: CPT

## 2024-11-11 PROCEDURE — 36415 COLL VENOUS BLD VENIPUNCTURE: CPT

## 2024-11-11 PROCEDURE — 84484 ASSAY OF TROPONIN QUANT: CPT

## 2024-11-11 PROCEDURE — 80053 COMPREHEN METABOLIC PANEL: CPT

## 2024-11-11 PROCEDURE — 85025 COMPLETE CBC W/AUTO DIFF WBC: CPT

## 2024-11-11 RX ORDER — KETOROLAC TROMETHAMINE 30 MG/ML
15 INJECTION INTRAMUSCULAR; INTRAVENOUS ONCE
Refills: 0 | Status: DISCONTINUED | OUTPATIENT
Start: 2024-11-11 | End: 2024-11-11

## 2024-11-11 RX ORDER — ZOLPIDEM TARTRATE 10 MG
5 TABLET ORAL AT BEDTIME
Refills: 0 | Status: DISCONTINUED | OUTPATIENT
Start: 2024-11-11 | End: 2024-11-12

## 2024-11-11 RX ORDER — LOSARTAN POTASSIUM 25 MG/1
100 TABLET ORAL DAILY
Refills: 0 | Status: DISCONTINUED | OUTPATIENT
Start: 2024-11-11 | End: 2024-11-12

## 2024-11-11 RX ADMIN — Medication 5 MILLIGRAM(S): at 23:18

## 2024-11-11 RX ADMIN — KETOROLAC TROMETHAMINE 15 MILLIGRAM(S): 30 INJECTION INTRAMUSCULAR; INTRAVENOUS at 19:19

## 2024-11-11 NOTE — ED ADULT NURSE NOTE - NSFALLUNIVINTERV_ED_ALL_ED
Bed/Stretcher in lowest position, wheels locked, appropriate side rails in place/Call bell, personal items and telephone in reach/Instruct patient to call for assistance before getting out of bed/chair/stretcher/Non-slip footwear applied when patient is off stretcher/[place] to call system/Physically safe environment - no spills, clutter or unnecessary equipment/Purposeful proactive rounding/Room/bathroom lighting operational, light cord in reach

## 2024-11-11 NOTE — ED PROVIDER NOTE - PHYSICAL EXAMINATION
CONSTITUTIONAL: well-appearing, well nourished, non-toxic, NAD  HEAD: NCAT  EYES: EOMI, no scleral icterus  ENT: Moist mucous membranes  NECK: Full ROM. no tenderness on palpation  CARD: RRR  RESP: clear to ausculation b/l  ABD: soft, non-tender  EXT: Full ROM, no bony tenderness, no pedal edema, no calf tenderness  NEURO: normal motor. normal sensory.   PSYCH: Cooperative, appropriate.

## 2024-11-11 NOTE — ED PROVIDER NOTE - OBJECTIVE STATEMENT
74-year-old female with history of HTN and HLD who follows up with Dr. Mars without known cardiac history with negative CCTA stress test 3 to 4 years ago and recent normal EKG this past summer presents for evaluation of the wound shortness of breath and chest pain on exertion started 2 PM today. Patient also endorses right neck pain that started last night Patient endorses having a cough for a week, and has had midsternal chest pain with palpitations, but denies any recent fevers, chills, sweats, nausea, vomiting, diarrhea, abdominal pain, urinary symptoms, numbness or tingling.

## 2024-11-11 NOTE — ED PROVIDER NOTE - CLINICAL SUMMARY MEDICAL DECISION MAKING FREE TEXT BOX
Patient presented today with palpitations and shortness of breath/chest pain.  Labs indicative of negative troponin.  EKG was grossly unremarkable.  Given patient's age, risk factors and last invasive testing being a few years ago, decision was made to place into observation for stress testing.     EKG interpretation:  NSR, no ST elevations, no t wave inversions as interpreted by Iftikhar Koch DO

## 2024-11-11 NOTE — ED ADULT NURSE REASSESSMENT NOTE - NS ED NURSE REASSESS COMMENT FT1
Patient assessed bedside.  A/O x 4.  No s/s of acute pain or distress at this time.  Pt placed under observation.  Pt safety and comfort maintained.

## 2024-11-11 NOTE — ED ADULT NURSE REASSESSMENT NOTE - NS ED NURSE REASSESS COMMENT FT1
Pt reassessed A/O times 4 Vs stable on cardiac monitor C/O palpitation SOB  ,comfort provide on the bed Ed attending made aware EDG order is done , no action taken at thistime on going nursing observation .

## 2024-11-12 VITALS
RESPIRATION RATE: 18 BRPM | DIASTOLIC BLOOD PRESSURE: 84 MMHG | SYSTOLIC BLOOD PRESSURE: 142 MMHG | HEART RATE: 74 BPM | OXYGEN SATURATION: 96 % | TEMPERATURE: 98 F

## 2024-11-12 LAB — TROPONIN T, HIGH SENSITIVITY RESULT: <6 NG/L — SIGNIFICANT CHANGE UP (ref 6–13)

## 2024-11-12 PROCEDURE — 78452 HT MUSCLE IMAGE SPECT MULT: CPT | Mod: 26,MC

## 2024-11-12 PROCEDURE — 99239 HOSP IP/OBS DSCHRG MGMT >30: CPT

## 2024-11-12 PROCEDURE — 93016 CV STRESS TEST SUPVJ ONLY: CPT

## 2024-11-12 PROCEDURE — 93018 CV STRESS TEST I&R ONLY: CPT

## 2024-11-12 RX ORDER — REGADENOSON 0.08 MG/ML
0.4 INJECTION, SOLUTION INTRAVENOUS ONCE
Refills: 0 | Status: COMPLETED | OUTPATIENT
Start: 2024-11-12 | End: 2024-11-12

## 2024-11-12 RX ADMIN — LOSARTAN POTASSIUM 100 MILLIGRAM(S): 25 TABLET ORAL at 06:46

## 2024-11-12 RX ADMIN — REGADENOSON 0.4 MILLIGRAM(S): 0.08 INJECTION, SOLUTION INTRAVENOUS at 13:09

## 2024-11-12 NOTE — ED CDU PROVIDER DISPOSITION NOTE - CARE PROVIDER_API CALL
Silvestre Mars  Cardiovascular Disease  [address] [place] [address] [address]  [place] [address]  Phone: [phone]  Fax: [phone]  Follow Up Time: 4-6 Days

## 2024-11-12 NOTE — ED CDU PROVIDER DISPOSITION NOTE - CLINICAL COURSE
Patient presented today with palpitations and shortness of breath/chest pain.  Labs indicative of negative troponin.  EKG was grossly unremarkable.  stress test no acute abnormalities. pt discharged with cardiology follow up

## 2024-11-12 NOTE — ED CDU PROVIDER DISPOSITION NOTE - PATIENT PORTAL LINK FT
You can access the FollowMyHealth Patient Portal offered by [facility] by registering at the following website: [email or web address]. By joining [employer]’s FollowMyHealth portal, you will also be able to view your health information using other applications (apps) compatible with our system.

## 2024-11-12 NOTE — ED CDU PROVIDER INITIAL DAY NOTE - ATTENDING APP SHARED VISIT CONTRIBUTION OF CARE
Patient presented today with palpitations and shortness of breath/chest pain.  Labs indicative of negative troponin.  EKG was grossly unremarkable. pt placed in EDOU for stress test.  CONSTITUTIONAL: well developed; in no acute distress  HEAD: normocephalic; atraumatic  EYES: no conjunctival injection, no scleral icterus  ENT: no nasal discharge; airway clear.  NECK: supple; non tender.  CARD: warm and well perfused, not tachycardic  RESP: breathing comfortably on RA, speaking in full sentences w/o distress  Abdomen: Soft, None Tender, None Distended   EXT: moving all extremities spontaneously, normal ROM.  NEURO: alert, oriented, motor and sensory grossly intact

## 2024-11-12 NOTE — ED CDU PROVIDER INITIAL DAY NOTE - CLINICAL SUMMARY MEDICAL DECISION MAKING FREE TEXT BOX
Patient presented today with palpitations and shortness of breath/chest pain.  Labs indicative of negative troponin.  EKG was grossly unremarkable.  pt placed in EDOU for stress test. Patient presented today with palpitations and shortness of breath/chest pain.  Labs indicative of negative troponin.  EKG was grossly unremarkable.  pt placed in EDOU for stress test. .

## 2024-11-12 NOTE — ED CDU PROVIDER DISPOSITION NOTE - NSFOLLOWUPINSTRUCTIONS_ED_ALL_ED_FT
Palpitations    A palpitation is the feeling that your heartbeat is irregular or is faster than normal. It may feel like your heart is fluttering or skipping a beat. They may be caused by many things, including smoking, caffeine, alcohol, stress, and certain medicines. Although most causes of palpitations are not serious, palpitations can be a sign of a serious medical problem. Avoid caffeine, alcohol, and tobacco products at home. Try to reduce stress and anxiety and make sure to get plenty of rest.     SEEK IMMEDIATE MEDICAL CARE IF YOU HAVE ANY OF THE FOLLOWING SYMPTOMS: chest pain, shortness of breath, severe headache, dizziness/lightheadedness, or fainting.  Chest Pain    Chest pain can be caused by many different conditions which may or may not be dangerous. Causes include heartburn, lung infections, heart attack, blood clot in lungs, skin infections, strain or damage to muscle, cartilage, or bones, etc. In addition to a history and physical examination, an electrocardiogram (ECG) or other lab tests may have been performed to determine the cause of your chest pain. Follow up with your primary care provider or with a cardiologist as instructed.     SEEK IMMEDIATE MEDICAL CARE IF YOU HAVE ANY OF THE FOLLOWING SYMPTOMS: worsening chest pain, coughing up blood, unexplained back/neck/jaw pain, severe abdominal pain, dizziness or lightheadedness, fainting, shortness of breath, sweaty or clammy skin, vomiting, or racing heart beat. These symptoms may represent a serious problem that is an emergency. Do not wait to see if the symptoms will go away. Get medical help right away. Call 911 and do not drive yourself to the hospital.

## 2024-11-19 ENCOUNTER — NON-APPOINTMENT (OUTPATIENT)
Age: 74
End: 2024-11-19

## 2024-12-10 ENCOUNTER — APPOINTMENT (OUTPATIENT)
Dept: PLASTIC SURGERY | Facility: CLINIC | Age: 74
End: 2024-12-10
Payer: MEDICARE

## 2024-12-10 PROCEDURE — 20550 NJX 1 TENDON SHEATH/LIGAMENT: CPT | Mod: LT

## 2024-12-10 PROCEDURE — 99212 OFFICE O/P EST SF 10 MIN: CPT | Mod: 25

## 2024-12-27 ENCOUNTER — APPOINTMENT (OUTPATIENT)
Dept: PLASTIC SURGERY | Facility: CLINIC | Age: 74
End: 2024-12-27
Payer: MEDICARE

## 2024-12-27 PROCEDURE — 26055 INCISE FINGER TENDON SHEATH: CPT

## 2025-01-07 ENCOUNTER — NON-APPOINTMENT (OUTPATIENT)
Age: 75
End: 2025-01-07

## 2025-01-07 ENCOUNTER — APPOINTMENT (OUTPATIENT)
Dept: CARDIOLOGY | Facility: CLINIC | Age: 75
End: 2025-01-07
Payer: MEDICARE

## 2025-01-07 VITALS
SYSTOLIC BLOOD PRESSURE: 122 MMHG | WEIGHT: 178 LBS | BODY MASS INDEX: 32.76 KG/M2 | HEIGHT: 62 IN | DIASTOLIC BLOOD PRESSURE: 71 MMHG | HEART RATE: 77 BPM

## 2025-01-07 DIAGNOSIS — E78.5 HYPERLIPIDEMIA, UNSPECIFIED: ICD-10-CM

## 2025-01-07 DIAGNOSIS — I10 ESSENTIAL (PRIMARY) HYPERTENSION: ICD-10-CM

## 2025-01-07 DIAGNOSIS — G47.33 OBSTRUCTIVE SLEEP APNEA (ADULT) (PEDIATRIC): ICD-10-CM

## 2025-01-07 DIAGNOSIS — F41.9 ANXIETY DISORDER, UNSPECIFIED: ICD-10-CM

## 2025-01-07 PROCEDURE — 99214 OFFICE O/P EST MOD 30 MIN: CPT | Mod: 25

## 2025-01-07 PROCEDURE — 93000 ELECTROCARDIOGRAM COMPLETE: CPT

## 2025-01-10 ENCOUNTER — APPOINTMENT (OUTPATIENT)
Dept: PLASTIC SURGERY | Facility: CLINIC | Age: 75
End: 2025-01-10
Payer: MEDICARE

## 2025-01-10 DIAGNOSIS — M65.322 TRIGGER FINGER, LEFT INDEX FINGER: ICD-10-CM

## 2025-01-10 DIAGNOSIS — M65.342 TRIGGER FINGER, LEFT RING FINGER: ICD-10-CM

## 2025-01-10 PROCEDURE — 99024 POSTOP FOLLOW-UP VISIT: CPT

## 2025-01-30 NOTE — HISTORY OF PRESENT ILLNESS
Last read by Bhanu Stein at 11:31 AM on 1/30/2025.    [FreeTextEntry1] : The patient has not fallen . She had had surgery on achilles and on right shoulder . She is going to PT . She has not had chest pain or SOB . . She has had a cougha dn URI . May have had influenza .

## 2025-02-18 ENCOUNTER — APPOINTMENT (OUTPATIENT)
Dept: CARDIOLOGY | Facility: CLINIC | Age: 75
End: 2025-02-18

## 2025-02-20 ENCOUNTER — APPOINTMENT (OUTPATIENT)
Dept: PLASTIC SURGERY | Facility: CLINIC | Age: 75
End: 2025-02-20
Payer: MEDICARE

## 2025-02-20 PROCEDURE — 99024 POSTOP FOLLOW-UP VISIT: CPT

## 2025-05-15 ENCOUNTER — APPOINTMENT (OUTPATIENT)
Dept: PLASTIC SURGERY | Facility: CLINIC | Age: 75
End: 2025-05-15
Payer: MEDICARE

## 2025-05-15 PROCEDURE — 99212 OFFICE O/P EST SF 10 MIN: CPT

## 2025-05-15 PROCEDURE — G2211 COMPLEX E/M VISIT ADD ON: CPT

## 2025-05-22 ENCOUNTER — OUTPATIENT (OUTPATIENT)
Dept: OUTPATIENT SERVICES | Facility: HOSPITAL | Age: 75
LOS: 1 days | End: 2025-05-22
Payer: MEDICARE

## 2025-05-22 DIAGNOSIS — Z00.8 ENCOUNTER FOR OTHER GENERAL EXAMINATION: ICD-10-CM

## 2025-05-22 DIAGNOSIS — M65.342 TRIGGER FINGER, LEFT RING FINGER: ICD-10-CM

## 2025-05-22 DIAGNOSIS — Z96.659 PRESENCE OF UNSPECIFIED ARTIFICIAL KNEE JOINT: Chronic | ICD-10-CM

## 2025-05-22 DIAGNOSIS — Z96.651 PRESENCE OF RIGHT ARTIFICIAL KNEE JOINT: Chronic | ICD-10-CM

## 2025-05-22 DIAGNOSIS — Z96.652 PRESENCE OF LEFT ARTIFICIAL KNEE JOINT: Chronic | ICD-10-CM

## 2025-05-22 PROCEDURE — L3929: CPT

## 2025-05-22 PROCEDURE — 97165 OT EVAL LOW COMPLEX 30 MIN: CPT | Mod: GO

## 2025-05-22 PROCEDURE — 97760 ORTHOTIC MGMT&TRAING 1ST ENC: CPT | Mod: GO

## 2025-05-23 DIAGNOSIS — M65.342 TRIGGER FINGER, LEFT RING FINGER: ICD-10-CM

## 2025-05-28 ENCOUNTER — OUTPATIENT (OUTPATIENT)
Dept: OUTPATIENT SERVICES | Facility: HOSPITAL | Age: 75
LOS: 1 days | End: 2025-05-28

## 2025-05-28 DIAGNOSIS — Z96.651 PRESENCE OF RIGHT ARTIFICIAL KNEE JOINT: Chronic | ICD-10-CM

## 2025-05-28 DIAGNOSIS — Z96.659 PRESENCE OF UNSPECIFIED ARTIFICIAL KNEE JOINT: Chronic | ICD-10-CM

## 2025-05-28 DIAGNOSIS — M65.342 TRIGGER FINGER, LEFT RING FINGER: ICD-10-CM

## 2025-05-28 DIAGNOSIS — Z96.652 PRESENCE OF LEFT ARTIFICIAL KNEE JOINT: Chronic | ICD-10-CM

## 2025-05-30 ENCOUNTER — OUTPATIENT (OUTPATIENT)
Dept: OUTPATIENT SERVICES | Facility: HOSPITAL | Age: 75
LOS: 1 days | End: 2025-05-30

## 2025-05-30 DIAGNOSIS — Z96.659 PRESENCE OF UNSPECIFIED ARTIFICIAL KNEE JOINT: Chronic | ICD-10-CM

## 2025-05-30 DIAGNOSIS — M65.342 TRIGGER FINGER, LEFT RING FINGER: ICD-10-CM

## 2025-05-30 DIAGNOSIS — M65.322 TRIGGER FINGER, LEFT INDEX FINGER: ICD-10-CM

## 2025-05-30 DIAGNOSIS — Z96.651 PRESENCE OF RIGHT ARTIFICIAL KNEE JOINT: Chronic | ICD-10-CM

## 2025-05-30 DIAGNOSIS — Z96.652 PRESENCE OF LEFT ARTIFICIAL KNEE JOINT: Chronic | ICD-10-CM

## 2025-06-02 ENCOUNTER — OUTPATIENT (OUTPATIENT)
Dept: OUTPATIENT SERVICES | Facility: HOSPITAL | Age: 75
LOS: 1 days | End: 2025-06-02
Payer: MEDICARE

## 2025-06-02 DIAGNOSIS — Z96.651 PRESENCE OF RIGHT ARTIFICIAL KNEE JOINT: Chronic | ICD-10-CM

## 2025-06-02 DIAGNOSIS — Z96.652 PRESENCE OF LEFT ARTIFICIAL KNEE JOINT: Chronic | ICD-10-CM

## 2025-06-02 DIAGNOSIS — Z96.659 PRESENCE OF UNSPECIFIED ARTIFICIAL KNEE JOINT: Chronic | ICD-10-CM

## 2025-06-02 DIAGNOSIS — M65.342 TRIGGER FINGER, LEFT RING FINGER: ICD-10-CM

## 2025-06-02 PROCEDURE — 97110 THERAPEUTIC EXERCISES: CPT | Mod: GO

## 2025-06-03 ENCOUNTER — APPOINTMENT (OUTPATIENT)
Dept: CARDIOLOGY | Facility: CLINIC | Age: 75
End: 2025-06-03
Payer: MEDICARE

## 2025-06-03 VITALS
DIASTOLIC BLOOD PRESSURE: 70 MMHG | WEIGHT: 179 LBS | HEART RATE: 94 BPM | SYSTOLIC BLOOD PRESSURE: 136 MMHG | BODY MASS INDEX: 32.94 KG/M2 | HEIGHT: 62 IN

## 2025-06-03 DIAGNOSIS — I10 ESSENTIAL (PRIMARY) HYPERTENSION: ICD-10-CM

## 2025-06-03 DIAGNOSIS — E78.5 HYPERLIPIDEMIA, UNSPECIFIED: ICD-10-CM

## 2025-06-03 DIAGNOSIS — G47.33 OBSTRUCTIVE SLEEP APNEA (ADULT) (PEDIATRIC): ICD-10-CM

## 2025-06-03 DIAGNOSIS — M65.342 TRIGGER FINGER, LEFT RING FINGER: ICD-10-CM

## 2025-06-03 PROCEDURE — 93000 ELECTROCARDIOGRAM COMPLETE: CPT

## 2025-06-03 PROCEDURE — 99214 OFFICE O/P EST MOD 30 MIN: CPT | Mod: 25

## 2025-06-06 ENCOUNTER — OUTPATIENT (OUTPATIENT)
Dept: OUTPATIENT SERVICES | Facility: HOSPITAL | Age: 75
LOS: 1 days | End: 2025-06-06

## 2025-06-06 DIAGNOSIS — Z96.651 PRESENCE OF RIGHT ARTIFICIAL KNEE JOINT: Chronic | ICD-10-CM

## 2025-06-06 DIAGNOSIS — M65.342 TRIGGER FINGER, LEFT RING FINGER: ICD-10-CM

## 2025-06-06 DIAGNOSIS — Z96.652 PRESENCE OF LEFT ARTIFICIAL KNEE JOINT: Chronic | ICD-10-CM

## 2025-06-06 DIAGNOSIS — Z96.659 PRESENCE OF UNSPECIFIED ARTIFICIAL KNEE JOINT: Chronic | ICD-10-CM

## 2025-06-11 ENCOUNTER — OUTPATIENT (OUTPATIENT)
Dept: OUTPATIENT SERVICES | Facility: HOSPITAL | Age: 75
LOS: 1 days | End: 2025-06-11

## 2025-06-11 DIAGNOSIS — Z96.651 PRESENCE OF RIGHT ARTIFICIAL KNEE JOINT: Chronic | ICD-10-CM

## 2025-06-11 DIAGNOSIS — M65.342 TRIGGER FINGER, LEFT RING FINGER: ICD-10-CM

## 2025-06-11 DIAGNOSIS — Z96.652 PRESENCE OF LEFT ARTIFICIAL KNEE JOINT: Chronic | ICD-10-CM

## 2025-06-11 DIAGNOSIS — Z96.659 PRESENCE OF UNSPECIFIED ARTIFICIAL KNEE JOINT: Chronic | ICD-10-CM

## 2025-06-11 DIAGNOSIS — M65.322 TRIGGER FINGER, LEFT INDEX FINGER: ICD-10-CM

## 2025-06-15 NOTE — PRE-ANESTHESIA EVALUATION ADULT - BSA (M2)
1.82

## 2025-06-18 ENCOUNTER — OUTPATIENT (OUTPATIENT)
Dept: OUTPATIENT SERVICES | Facility: HOSPITAL | Age: 75
LOS: 1 days | End: 2025-06-18

## 2025-06-18 DIAGNOSIS — M65.342 TRIGGER FINGER, LEFT RING FINGER: ICD-10-CM

## 2025-06-18 DIAGNOSIS — M65.322 TRIGGER FINGER, LEFT INDEX FINGER: ICD-10-CM

## 2025-06-18 DIAGNOSIS — Z96.659 PRESENCE OF UNSPECIFIED ARTIFICIAL KNEE JOINT: Chronic | ICD-10-CM

## 2025-06-18 DIAGNOSIS — Z96.652 PRESENCE OF LEFT ARTIFICIAL KNEE JOINT: Chronic | ICD-10-CM

## 2025-06-18 DIAGNOSIS — Z96.651 PRESENCE OF RIGHT ARTIFICIAL KNEE JOINT: Chronic | ICD-10-CM

## 2025-06-20 ENCOUNTER — OUTPATIENT (OUTPATIENT)
Dept: OUTPATIENT SERVICES | Facility: HOSPITAL | Age: 75
LOS: 1 days | End: 2025-06-20

## 2025-06-20 DIAGNOSIS — M65.342 TRIGGER FINGER, LEFT RING FINGER: ICD-10-CM

## 2025-06-20 DIAGNOSIS — Z96.652 PRESENCE OF LEFT ARTIFICIAL KNEE JOINT: Chronic | ICD-10-CM

## 2025-06-20 DIAGNOSIS — Z96.651 PRESENCE OF RIGHT ARTIFICIAL KNEE JOINT: Chronic | ICD-10-CM

## 2025-06-20 DIAGNOSIS — Z96.659 PRESENCE OF UNSPECIFIED ARTIFICIAL KNEE JOINT: Chronic | ICD-10-CM

## 2025-06-20 DIAGNOSIS — M65.322 TRIGGER FINGER, LEFT INDEX FINGER: ICD-10-CM

## 2025-07-24 ENCOUNTER — APPOINTMENT (OUTPATIENT)
Dept: PLASTIC SURGERY | Facility: CLINIC | Age: 75
End: 2025-07-24
Payer: MEDICARE

## 2025-07-24 DIAGNOSIS — M19.90 UNSPECIFIED OSTEOARTHRITIS, UNSPECIFIED SITE: ICD-10-CM

## 2025-07-24 PROCEDURE — 20550 NJX 1 TENDON SHEATH/LIGAMENT: CPT

## 2025-07-24 PROCEDURE — 99212 OFFICE O/P EST SF 10 MIN: CPT | Mod: 25

## 2025-07-30 ENCOUNTER — OUTPATIENT (OUTPATIENT)
Dept: OUTPATIENT SERVICES | Facility: HOSPITAL | Age: 75
LOS: 1 days | End: 2025-07-30
Payer: MEDICARE

## 2025-07-30 ENCOUNTER — RESULT REVIEW (OUTPATIENT)
Age: 75
End: 2025-07-30

## 2025-07-30 DIAGNOSIS — Z96.659 PRESENCE OF UNSPECIFIED ARTIFICIAL KNEE JOINT: Chronic | ICD-10-CM

## 2025-07-30 DIAGNOSIS — M19.90 UNSPECIFIED OSTEOARTHRITIS, UNSPECIFIED SITE: ICD-10-CM

## 2025-07-30 DIAGNOSIS — Z96.651 PRESENCE OF RIGHT ARTIFICIAL KNEE JOINT: Chronic | ICD-10-CM

## 2025-07-30 DIAGNOSIS — Z96.652 PRESENCE OF LEFT ARTIFICIAL KNEE JOINT: Chronic | ICD-10-CM

## 2025-07-30 PROCEDURE — 73130 X-RAY EXAM OF HAND: CPT | Mod: 26,50

## 2025-07-30 PROCEDURE — 73130 X-RAY EXAM OF HAND: CPT | Mod: 50

## 2025-07-31 DIAGNOSIS — M19.90 UNSPECIFIED OSTEOARTHRITIS, UNSPECIFIED SITE: ICD-10-CM

## 2025-09-04 ENCOUNTER — APPOINTMENT (OUTPATIENT)
Dept: PLASTIC SURGERY | Facility: CLINIC | Age: 75
End: 2025-09-04
Payer: MEDICARE

## 2025-09-04 DIAGNOSIS — M24.549 CONTRACTURE, UNSPECIFIED HAND: ICD-10-CM

## 2025-09-04 DIAGNOSIS — M24.542 CONTRACTURE, LEFT HAND: ICD-10-CM

## 2025-09-04 PROCEDURE — 99213 OFFICE O/P EST LOW 20 MIN: CPT

## 2025-09-18 ENCOUNTER — APPOINTMENT (OUTPATIENT)
Dept: PLASTIC SURGERY | Facility: CLINIC | Age: 75
End: 2025-09-18